# Patient Record
Sex: FEMALE | Race: WHITE | NOT HISPANIC OR LATINO | Employment: FULL TIME | URBAN - METROPOLITAN AREA
[De-identification: names, ages, dates, MRNs, and addresses within clinical notes are randomized per-mention and may not be internally consistent; named-entity substitution may affect disease eponyms.]

---
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
## 2022-05-10 ENCOUNTER — NON-PROVIDER VISIT (OUTPATIENT)
Dept: MEDICAL GROUP | Facility: MEDICAL CENTER | Age: 22
End: 2022-05-10
Payer: COMMERCIAL

## 2022-05-10 DIAGNOSIS — Z11.1 ENCOUNTER FOR TUBERCULIN SKIN TEST: ICD-10-CM

## 2022-05-10 PROCEDURE — 99999 PR NO CHARGE: CPT | Performed by: FAMILY MEDICINE

## 2022-05-10 PROCEDURE — 86580 TB INTRADERMAL TEST: CPT | Performed by: FAMILY MEDICINE

## 2022-05-10 NOTE — PROGRESS NOTES
Annabel Ramírez is a 21 y.o. female here for a non-provider visit for PPD placement -- Step 1 of 1    Reason for PPD:  work requirement    1. TB evaluation questionnaire completed by patient? Yes      -  If any answers marked yes did you contact a provider prior to placing? Not Indicated  2.  Patient notified to return to clinic for reading on: Thursday May 12th after 8:20am or Friday May 13th before 8:20am  3.  PPD Placement documentation completed on TB evaluation questionnaire? Yes  4.  Location of TB evaluation questionnaire filed: [facility]

## 2022-05-11 ENCOUNTER — OFFICE VISIT (OUTPATIENT)
Dept: MEDICAL GROUP | Facility: MEDICAL CENTER | Age: 22
End: 2022-05-11
Payer: COMMERCIAL

## 2022-05-11 VITALS
RESPIRATION RATE: 18 BRPM | BODY MASS INDEX: 22.13 KG/M2 | HEART RATE: 77 BPM | SYSTOLIC BLOOD PRESSURE: 100 MMHG | WEIGHT: 141 LBS | OXYGEN SATURATION: 99 % | HEIGHT: 67 IN | TEMPERATURE: 98.2 F | DIASTOLIC BLOOD PRESSURE: 60 MMHG

## 2022-05-11 DIAGNOSIS — J02.9 SORE THROAT: ICD-10-CM

## 2022-05-11 LAB
HETEROPH AB SER QL LA: POSITIVE
INT CON NEG: NEGATIVE
INT CON NEG: NEGATIVE
INT CON POS: POSITIVE
INT CON POS: POSITIVE
S PYO AG THROAT QL: NEGATIVE

## 2022-05-11 PROCEDURE — 99213 OFFICE O/P EST LOW 20 MIN: CPT | Performed by: FAMILY MEDICINE

## 2022-05-11 PROCEDURE — 87880 STREP A ASSAY W/OPTIC: CPT | Performed by: FAMILY MEDICINE

## 2022-05-11 PROCEDURE — 86308 HETEROPHILE ANTIBODY SCREEN: CPT | Performed by: FAMILY MEDICINE

## 2022-05-11 ASSESSMENT — PATIENT HEALTH QUESTIONNAIRE - PHQ9: CLINICAL INTERPRETATION OF PHQ2 SCORE: 0

## 2022-05-12 ENCOUNTER — NON-PROVIDER VISIT (OUTPATIENT)
Dept: MEDICAL GROUP | Facility: MEDICAL CENTER | Age: 22
End: 2022-05-12
Payer: COMMERCIAL

## 2022-05-12 LAB — TB WHEAL 3D P 5 TU DIAM: NORMAL MM

## 2022-05-12 PROCEDURE — 99999 PR NO CHARGE: CPT | Performed by: FAMILY MEDICINE

## 2022-05-12 NOTE — PROGRESS NOTES
Annabel Ramírez is a 21 y.o. female here for a non-provider visit for PPD reading -- Step 1 of 2.      1.  Resulted in Epic under enter/edit results? Yes   2.  TB evaluation questionnaire scanned into chart and original given to patient?Yes      3. Was induration greater than 0 mm? No.

## 2022-05-12 NOTE — PROGRESS NOTES
"[facility]  Progress Note  Established Patient    Subjective:   Annabel Ramírez is a 21 y.o. female here today with a chief complaint of sore throat. The patient is alone.     Sore throat  Patient reports 5 days of a sore throat worse when she swallows and relieved with Advil.  She denies fever, chills, shortness of breath.  She mentions a slight, occasional cough.  She tested herself for COVID at home and this was negative.      Current Outpatient Medications on File Prior to Visit   Medication Sig Dispense Refill   • triamcinolone acetonide (KENALOG) 0.1 % Cream APPLY TO AFFECTED PARTS DAILY     • levonorgestrel (MIRENA, 52 MG,) 52 mg (20 mcg/24 hr) IUD 1 Each by Intrauterine route one time.     • betamethasone dipropionate (DIPROLENE) 0.05 % Ointment Apply a thin layer to rash BID PRN. Do not use longer than 7 days straight. 50 g 1   • ibuprofen (MOTRIN) 200 MG Tab Take 200 mg by mouth every 6 hours as needed.       No current facility-administered medications on file prior to visit.          Objective:     Vitals:    05/11/22 1600   BP: 100/60   BP Location: Left arm   Patient Position: Sitting   BP Cuff Size: Adult long   Pulse: 77   Resp: 18   Temp: 36.8 °C (98.2 °F)   TempSrc: Temporal   SpO2: 99%   Weight: 64 kg (141 lb)   Height: 1.702 m (5' 7\")       Physical Exam:  General: alert in no apparent distress.   ENMT: There is pharyngeal erythema and tonsillar exudates.  There is anterior and posterior cervical lymphadenopathy.  Uvula midline.  Abdomen: No splenomegaly.        Assessment and Plan:     1. Sore throat  Point-of-care mono testing is positive.  Patient has mononucleosis.  Point-of-care strep and COVID testing is negative.  I reviewed supportive measures with the patient and the need to return with any worsening symptoms or if his symptoms do not resolve within about 10 days.  Handout provided to the patient.  Advised her to avoid contact sports for 4 weeks.  No indication for " steroids.        Followup: Return if symptoms worsen or fail to improve.

## 2022-10-09 ENCOUNTER — APPOINTMENT (OUTPATIENT)
Dept: URGENT CARE | Facility: CLINIC | Age: 22
End: 2022-10-09
Payer: COMMERCIAL

## 2022-11-02 ENCOUNTER — NON-PROVIDER VISIT (OUTPATIENT)
Dept: OCCUPATIONAL MEDICINE | Facility: CLINIC | Age: 22
End: 2022-11-02
Payer: COMMERCIAL

## 2022-11-02 DIAGNOSIS — Z02.1 PRE-EMPLOYMENT HEALTH SCREENING EXAMINATION: ICD-10-CM

## 2022-11-02 DIAGNOSIS — Z02.1 PRE-EMPLOYMENT DRUG SCREENING: Primary | ICD-10-CM

## 2022-11-02 LAB
AMP AMPHETAMINE: NORMAL
BAR BARBITURATES: NORMAL
BZO BENZODIAZEPINES: NORMAL
COC COCAINE: NORMAL
INT CON NEG: NORMAL
INT CON POS: NORMAL
MDMA ECSTASY: NORMAL
MET METHAMPHETAMINES: NORMAL
MTD METHADONE: NORMAL
OPI OPIATES: NORMAL
OXY OXYCODONE: NORMAL
PCP PHENCYCLIDINE: NORMAL
POC URINE DRUG SCREEN OCDRS: NEGATIVE
THC: NORMAL

## 2022-11-02 PROCEDURE — 80305 DRUG TEST PRSMV DIR OPT OBS: CPT | Performed by: PREVENTIVE MEDICINE

## 2022-11-02 PROCEDURE — 86580 TB INTRADERMAL TEST: CPT | Performed by: NURSE PRACTITIONER

## 2022-11-04 ENCOUNTER — NON-PROVIDER VISIT (OUTPATIENT)
Dept: OCCUPATIONAL MEDICINE | Facility: CLINIC | Age: 22
End: 2022-11-04
Payer: COMMERCIAL

## 2022-11-07 LAB — TB WHEAL 3D P 5 TU DIAM: NORMAL MM

## 2022-11-09 ENCOUNTER — NON-PROVIDER VISIT (OUTPATIENT)
Dept: OCCUPATIONAL MEDICINE | Facility: CLINIC | Age: 22
End: 2022-11-09
Payer: COMMERCIAL

## 2022-11-09 DIAGNOSIS — Z11.1 ENCOUNTER FOR PPD TEST: Primary | ICD-10-CM

## 2022-11-09 PROCEDURE — 86580 TB INTRADERMAL TEST: CPT | Performed by: NURSE PRACTITIONER

## 2022-11-11 ENCOUNTER — NON-PROVIDER VISIT (OUTPATIENT)
Dept: OCCUPATIONAL MEDICINE | Facility: CLINIC | Age: 22
End: 2022-11-11
Payer: COMMERCIAL

## 2022-11-11 DIAGNOSIS — Z11.1 ENCOUNTER FOR PPD SKIN TEST READING: ICD-10-CM

## 2022-11-11 LAB — TB WHEAL 3D P 5 TU DIAM: NORMAL MM

## 2022-12-28 ENCOUNTER — HOSPITAL ENCOUNTER (OUTPATIENT)
Dept: RADIOLOGY | Facility: MEDICAL CENTER | Age: 22
End: 2022-12-28
Attending: CHIROPRACTOR
Payer: COMMERCIAL

## 2022-12-28 DIAGNOSIS — M99.01 CERVICAL SEGMENT DYSFUNCTION: ICD-10-CM

## 2022-12-28 DIAGNOSIS — M99.02 THORACIC SEGMENT DYSFUNCTION: ICD-10-CM

## 2022-12-28 DIAGNOSIS — M99.04 SOMATIC DYSFUNCTION OF SACRAL REGION: ICD-10-CM

## 2022-12-28 DIAGNOSIS — M99.03 SOMATIC DYSFUNCTION OF LUMBAR REGION: ICD-10-CM

## 2022-12-28 PROCEDURE — 72072 X-RAY EXAM THORAC SPINE 3VWS: CPT

## 2022-12-28 PROCEDURE — 72170 X-RAY EXAM OF PELVIS: CPT

## 2022-12-28 PROCEDURE — 72040 X-RAY EXAM NECK SPINE 2-3 VW: CPT

## 2022-12-28 PROCEDURE — 72100 X-RAY EXAM L-S SPINE 2/3 VWS: CPT

## 2023-03-14 ENCOUNTER — HOSPITAL ENCOUNTER (EMERGENCY)
Facility: MEDICAL CENTER | Age: 23
End: 2023-03-14
Attending: EMERGENCY MEDICINE
Payer: COMMERCIAL

## 2023-03-14 VITALS
WEIGHT: 141.09 LBS | OXYGEN SATURATION: 97 % | SYSTOLIC BLOOD PRESSURE: 125 MMHG | RESPIRATION RATE: 19 BRPM | HEART RATE: 84 BPM | BODY MASS INDEX: 22.15 KG/M2 | TEMPERATURE: 98.2 F | HEIGHT: 67 IN | DIASTOLIC BLOOD PRESSURE: 81 MMHG
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
## 2023-03-14 DIAGNOSIS — R55 VASOVAGAL SYNCOPE: ICD-10-CM

## 2023-03-14 PROCEDURE — 36415 COLL VENOUS BLD VENIPUNCTURE: CPT

## 2023-03-14 PROCEDURE — 82962 GLUCOSE BLOOD TEST: CPT

## 2023-03-14 PROCEDURE — 99284 EMERGENCY DEPT VISIT MOD MDM: CPT

## 2023-03-14 NOTE — DISCHARGE INSTRUCTIONS
Make sure that you drink plenty of fluids and eat breakfast this morning.  Tylenol if needed for pain.  Return if problems

## 2023-03-14 NOTE — ED TRIAGE NOTES
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 Pt became lightheaded and had syncopal event hitting the glass door of the room and falling to the floor. ER staff and ERP immediately w/ pt. 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

## 2023-03-14 NOTE — LETTER
"  FORM C-4:  EMPLOYEE’S CLAIM FOR COMPENSATION/ REPORT OF INITIAL TREATMENT  EMPLOYEE’S CLAIM - PROVIDE ALL INFORMATION REQUESTED   First Name Annabel Last Name Darrell Birthdate 2000  Sex female Claim Number   Home Address [address]             Zip [address]                                   Age  22 y.o. 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 Be specific and answer in detail. Use additional sheet if necessary)  I passed out and fell against the door of a patient's room before  fallig to the ground   If you believe that you have an occupational disease, when did you first have knowledge of the disability and it relationship to your employment? N/A Witnesses to the Accident  Dr. Karlene Montero   Nature of Injury or Occupational Disease  Workers' Compensation Part(s) of Body Injured or Affected  Defer, ,     I CERTIFY THAT THE ABOVE IS TRUE AND CORRECT TO THE BEST OF MY KNOWLEDGE AND THAT I HAVE PROVIDED THIS INFORMATION IN ORDER TO OBTAIN THE BENEFITS OF [place]  I HEREBY AUTHORIZE ANY PHYSICIAN, CHIROPRACTOR, SURGEON, PRACTITIONER, OR OTHER PERSON, ANY HOSPITAL, INCLUDING [facility], ANY MEDICAL SERVICE ORGANIZATION, ANY INSURANCE COMPANY, OR OTHER INSTITUTION OR ORGANIZATION TO RELEASE TO EACH OTHER, ANY MEDICAL OR OTHER INFORMATION, INCLUDING BENEFITS PAID OR PAYABLE, PERTINENT TO THIS INJURY OR DISEASE, EXCEPT INFORMATION RELATIVE TO DIAGNOSIS, TREATMENT AND/OR COUNSELING FOR AIDS, PSYCHOLOGICAL CONDITIONS, ALCOHOL OR CONTROLLED SUBSTANCES, FOR WHICH I MUST GIVE SPECIFIC AUTHORIZATION.  A PHOTOSTAT OF THIS AUTHORIZATION SHALL BE AS VALID AS THE ORIGINAL.  Date    03/14/2023    Place  [facility]     Employee’s Signature   THIS REPORT MUST BE COMPLETED AND MAILED WITHIN 3 WORKING DAYS OF TREATMENT   Place [facility], EMERGENCY DEPT                       Name of Facility [facility]   Date  3/14/2023 Diagnosis  (W18.30XA) Fall from ground level  (R55) Vasovagal syncope Is there evidence the injured employee was under the influence of alcohol and/or another controlled substance at the time of accident?   Hour  6:13 AM Description of Injury or Disease  Fall from ground level  Vasovagal syncope No   Treatment  Patient had vasovagal syncopal episode, no injuries were sustained.  She was given p.o. juice and granola bars and she is feeling remarkably improved.  She is able to ambulate with no dizziness.  She will be discharged in stable and improved condition.  Have you advised the patient to remain off work five days or more?         No   X-Ray Findings    If Yes   From Date    To Date      From information given by the employee, together with medical evidence, can you directly connect this injury or occupational disease as job incurred? Yes If No, is employee capable of: Full Duty  Yes Modified Duty      Is  "additional medical care by a physician indicated? No If Modified Duty, Specify any Limitations / Restrictions       Do you know of any previous injury or disease contributing to this condition or occupational disease? No    Date 3/14/2023 Print Doctor’s Name Winstno Karleneliza GODINEZ certify the employer’s copy of this form was mailed on:   Address [address] [place] [address]  [place] [id number] [phone] INSURER’S USE ONLY   Provider’s Tax ID Number [id number] Telephone Dept: [phone]    Doctor’s Signature e-SignKARLENE HUGHES D.O. Degree M.D.      Form C-4 (rev.10/07)                                                                         BRIEF DESCRIPTION OF RIGHTS AND BENEFITS  (Pursuant to [place] 616[place])    Notice of Injury or Occupational Disease (Incident Report Form C-1): If an injury or occupational disease (OD) arises out of and in the course of employment, you must provide written notice to your employer as soon as practicable, but no later than 7 days after the accident or OD. Your employer shall maintain a sufficient supply of the required forms.    Claim for Compensation (Form C-4): If medical treatment is sought, the form C-4 is available at the place of initial treatment. A completed \"Claim for Compensation\" (Form C-4) must be filed within 90 days after an accident or OD. The treating physician or chiropractor must, within 3 working days after treatment, complete and mail to the employer, the employer's insurer and third-party , the Claim for Compensation.    Medical Treatment: If you require medical treatment for your on-the-job injury or OD, you may be required to select a physician or chiropractor from a list provided by your workers’ compensation insurer, if it has contracted with an Organization for Managed Care (MCO) or Preferred Provider Organization (PPO) or providers of health care. If your employer has not entered into a contract with an MCO or PPO, you may select a physician or " chiropractor from the Panel of Physicians and Chiropractors. Any medical costs related to your industrial injury or OD will be paid by your insurer.    Temporary Total Disability (TTD): If your doctor has certified that you are unable to work for a period of at least 5 consecutive days, or 5 cumulative days in a 20-day period, or places restrictions on you that your employer does not accommodate, you may be entitled to TTD compensation.    Temporary Partial Disability (TPD): If the wage you receive upon reemployment is less than the compensation for TTD to which you are entitled, the insurer may be required to pay you TPD compensation to make up the difference. TPD can only be paid for a maximum of 24 months.    Permanent Partial Disability (PPD): When your medical condition is stable and there is an indication of a PPD as a result of your injury or OD, within 30 days, your insurer must arrange for an evaluation by a rating physician or chiropractor to determine the degree of your PPD. The amount of your PPD award depends on the date of injury, the results of the PPD evaluation, your age and wage.    Permanent Total Disability (PTD): If you are medically certified by a treating physician or chiropractor as permanently and totally disabled and have been granted a PTD status by your insurer, you are entitled to receive monthly benefits not to exceed 66 2/3% of your average monthly wage. The amount of your PTD payments is subject to reduction if you previously received a lump-sum PPD award.    Vocational Rehabilitation Services: You may be eligible for vocational rehabilitation services if you are unable to return to the job due to a permanent physical impairment or permanent restrictions as a result of your injury or occupational disease.    Transportation and Per Cat Reimbursement: You may be eligible for travel expenses and per cat associated with medical treatment.    Reopening: You may be able to reopen your  claim if your condition worsens after claim closure.     Appeal Process: If you disagree with a written determination issued by the insurer or the insurer does not respond to your request, you may appeal to the Department of Administration, , by following the instructions contained in your determination letter. You must appeal the determination within 70 days from the date of the determination letter at [address], [address], [place], [place] [address], or [address], [address], [place], [place] [address]. If you disagree with the  decision, you may appeal to the Department of Administration, . You must file your appeal within 30 days from the date of the  decision letter at [address], [address], [place], [place] [address], or [address] [facility] [address], [facility] [address], [place], [place] [address]. If you disagree with a decision of an , you may file a petition for judicial review with the District Court. You must do so within 30 days of the Appeal Officer’s decision. You may be represented by an  at your own expense or you may contact the [facility] for possible representation.    [place]  for Injured Workers (NAIW): If you disagree with a  decision, you may request that NAIW represent you without charge at an  Hearing. For information regarding denial of benefits, you may contact the [facility] at: [address], [address], [place] [address], [phone], or [address], [place] [address], [phone]    To File a Complaint with the Division: If you wish to file a complaint with the  of the Division of Industrial Relations (DIR),  please contact the Workers’ Compensation Section, [address], [facility] [address], [place], [place] [address], telephone [phone], or [address], [place], [place] [address], telephone  [phone].    For assistance with Workers’ Compensation Issues: You may contact the [facility] Office for Consumer Health Assistance, [facility] [facility], [facility] [address], [place] [address], Toll Free 1-893.586.8859, Web site: http://[facility].[place].gov/Programs/MALGORZATA E-mail: malgorzata@[facility].[place].gov  D-2 (rev. 10/20)              __________________________________________________________________                                    03/14/2023            Employee Name / Signature                                                                                                                            Date

## 2023-03-15 LAB — GLUCOSE BLD STRIP.AUTO-MCNC: 90 MG/DL (ref 65–99)

## 2023-03-15 NOTE — ED PROVIDER NOTES
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 Pt became lightheaded and had syncopal event hitting the glass door of the room and falling to the floor. ER staff and ERP immediately w/ pt. +LOC, pt awoke approx 1 minute later       EXTERNAL RECORDS REVIEWED  Other none available    HPI/ROS  LIMITATION TO HISTORY   Select: : None  OUTSIDE HISTORIAN(S):  Nursing staff    Annabellowell Rich Darrell is a 22 y.o. female who presents tonight after syncopal episode in the patient's room while working with me.  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  She felt lightheaded and suddenly passed out.  She landed on the floor between the computer and the break out glass doors in the patient's room.  She has no complaints at this time.    PAST MEDICAL HISTORY   has a past medical history of Eczema.    SURGICAL HISTORY   has a past surgical history that includes dental extraction(s).    FAMILY HISTORY  Family History   Problem Relation Age of Onset    No Known Problems Mother     No Known Problems Father     No Known Problems Brother        SOCIAL HISTORY  Social History     Tobacco Use    Smoking status: Never    Smokeless tobacco: Never   Vaping Use    Vaping Use: Never used   Substance and Sexual Activity    Alcohol use: No    Drug use: No    Sexual activity: Yes     Partners: Male     Comment: Mirena       CURRENT MEDICATIONS  Home Medications       Reviewed by Laquita Huber R.N. (Registered Nurse) on 03/14/23 at 0528  Med List Status: Partial     Medication Last Dose Status   betamethasone dipropionate (DIPROLENE) 0.05 % Ointment  Active   ibuprofen (MOTRIN) 200 MG Tab  Active   levonorgestrel (MIRENA, 52 MG,) 52 mg (20 mcg/24 hr) IUD  Active   triamcinolone acetonide  "(KENALOG) 0.1 % Cream  Active                    ALLERGIES  Allergies   Allergen Reactions    Latex        PHYSICAL EXAM  VITAL SIGNS: /81   Pulse 84   Temp 36.8 °C (98.2 °F) (Temporal)   Resp 19   Ht 1.702 m (5' 7\")   Wt 64 kg (141 lb 1.5 oz)   SpO2 97%   BMI 22.10 kg/m²    Constitutional: Patient is well developed, well nourished. Non-toxic appearing.  Mild distress.  HENT: Normocephalic, atraumatic.  Nose normal with no bloody drainage. Oropharynx moist .  Eyes: PERRL, EOMI, Conjunctiva with mild erythema bilaterally.  Neck: Supple with no midline tenderness, bony step-offs or deformities.  She has full range of motion in flexion, extension and lateral rotation.  Lymphatic: No lymphadenopathy noted.   Cardiovascular: Normal heart rate and Regular rhythm. No murmur  Thorax & Lungs: Clear and equal breath sounds with good excursion. No respiratory distress, No chest tenderness, or signs of trauma.  Abdomen: Bowel sounds normal in all four quadrants. Soft,nontender  Skin: Warm, Dry, No contusions or abrasions.  Back: No cervical, thoracic, or lumbosacral tenderness.   Extremities: Peripheral pulses 4/4   Musculoskeletal: Normal range of motion in all major joints. No tenderness to palpation or major deformities noted.   Neurologic: Alert & oriented x 3, Normal motor function, Normal sensory function, No lateralizing or focal deficits noted. DTR's 4/4 bilaterally.  Psychiatric: Affect normal, Judgment normal, Mood normal.        COURSE & MEDICAL DECISION MAKING    ED Observation Status? No; Patient does not meet criteria for ED Observation.     INITIAL ASSESSMENT, COURSE AND PLAN  Care Narrative: Fingerstick glucose was performed and was found to be 90, patient was examined from head to toe and there is no signs of any injuries, she has no tenderness, she is awake and alert but is somewhat tremulous.  Blood pressure was stable.  She was given oral juice and granola bar and felt much better.  She will be " discharged home in stable and improved condition.  To follow-up with her primary care doctor as needed, rest and increase fluids over the next 24 hours.            DISPOSITION AND DISCUSSIONS  I have discussed management of the patient with the following physicians and BOLA's:  none    Discussion of management with other [facility] or appropriate source(s): None     Escalation of care considered, and ultimately not performed:after discussion with the patient / family, they have elected to decline an escalation in care    Barriers to care at this time, including but not limited to:  none .     Decision tools and prescription drugs considered including, but not limited to:  none .    FINAL DIAGNOSIS  1. Fall from ground level    2. Vasovagal syncope           Electronically signed by: Karlene Montero D.O., 3/15/2023 1:33 AM

## 2023-04-14 ENCOUNTER — HOSPITAL ENCOUNTER (OUTPATIENT)
Dept: LAB | Facility: MEDICAL CENTER | Age: 23
End: 2023-04-14
Attending: BEHAVIOR ANALYST
Payer: COMMERCIAL

## 2023-04-14 ENCOUNTER — OFFICE VISIT (OUTPATIENT)
Dept: MEDICAL GROUP | Facility: MEDICAL CENTER | Age: 23
End: 2023-04-14
Payer: COMMERCIAL

## 2023-04-14 VITALS
SYSTOLIC BLOOD PRESSURE: 104 MMHG | BODY MASS INDEX: 22.34 KG/M2 | OXYGEN SATURATION: 95 % | DIASTOLIC BLOOD PRESSURE: 52 MMHG | HEIGHT: 66 IN | HEART RATE: 100 BPM | WEIGHT: 139 LBS | TEMPERATURE: 98 F | RESPIRATION RATE: 14 BRPM

## 2023-04-14 DIAGNOSIS — R53.83 OTHER FATIGUE: ICD-10-CM

## 2023-04-14 DIAGNOSIS — Z00.00 WELLNESS EXAMINATION: ICD-10-CM

## 2023-04-14 DIAGNOSIS — R40.0 DAYTIME SLEEPINESS: ICD-10-CM

## 2023-04-14 DIAGNOSIS — R44.2 HYPNAGOGIC HALLUCINATIONS: ICD-10-CM

## 2023-04-14 DIAGNOSIS — M41.129 ADOLESCENT IDIOPATHIC SCOLIOSIS, UNSPECIFIED SPINAL REGION: ICD-10-CM

## 2023-04-14 PROBLEM — J02.9 SORE THROAT: Status: RESOLVED | Noted: 2022-05-11 | Resolved: 2023-04-14

## 2023-04-14 PROBLEM — M41.9 SCOLIOSIS: Status: ACTIVE | Noted: 2023-04-14

## 2023-04-14 LAB
25(OH)D3 SERPL-MCNC: 21 NG/ML (ref 30–100)
ALBUMIN SERPL BCP-MCNC: 4.3 G/DL (ref 3.2–4.9)
ALBUMIN/GLOB SERPL: 1.5 G/DL
ALP SERPL-CCNC: 55 U/L (ref 30–99)
ALT SERPL-CCNC: 11 U/L (ref 2–50)
ANION GAP SERPL CALC-SCNC: 9 MMOL/L (ref 7–16)
AST SERPL-CCNC: 14 U/L (ref 12–45)
BASOPHILS # BLD AUTO: 0.8 % (ref 0–1.8)
BASOPHILS # BLD: 0.06 K/UL (ref 0–0.12)
BILIRUB SERPL-MCNC: 1.4 MG/DL (ref 0.1–1.5)
BUN SERPL-MCNC: 14 MG/DL (ref 8–22)
CALCIUM ALBUM COR SERPL-MCNC: 9.1 MG/DL (ref 8.5–10.5)
CALCIUM SERPL-MCNC: 9.3 MG/DL (ref 8.5–10.5)
CHLORIDE SERPL-SCNC: 104 MMOL/L (ref 96–112)
CO2 SERPL-SCNC: 26 MMOL/L (ref 20–33)
CREAT SERPL-MCNC: 0.77 MG/DL (ref 0.5–1.4)
EOSINOPHIL # BLD AUTO: 0.21 K/UL (ref 0–0.51)
EOSINOPHIL NFR BLD: 2.7 % (ref 0–6.9)
ERYTHROCYTE [DISTWIDTH] IN BLOOD BY AUTOMATED COUNT: 39.4 FL (ref 35.9–50)
EST. AVERAGE GLUCOSE BLD GHB EST-MCNC: 100 MG/DL
GFR SERPLBLD CREATININE-BSD FMLA CKD-EPI: 113 ML/MIN/1.73 M 2
GLOBULIN SER CALC-MCNC: 2.9 G/DL (ref 1.9–3.5)
GLUCOSE SERPL-MCNC: 98 MG/DL (ref 65–99)
HBA1C MFR BLD: 5.1 % (ref 4–5.6)
HCT VFR BLD AUTO: 44.2 % (ref 37–47)
HGB BLD-MCNC: 14.9 G/DL (ref 12–16)
IMM GRANULOCYTES # BLD AUTO: 0.04 K/UL (ref 0–0.11)
IMM GRANULOCYTES NFR BLD AUTO: 0.5 % (ref 0–0.9)
IRON SATN MFR SERPL: 42 % (ref 15–55)
IRON SERPL-MCNC: 119 UG/DL (ref 40–170)
LYMPHOCYTES # BLD AUTO: 2.35 K/UL (ref 1–4.8)
LYMPHOCYTES NFR BLD: 30.2 % (ref 22–41)
MCH RBC QN AUTO: 31 PG (ref 27–33)
MCHC RBC AUTO-ENTMCNC: 33.7 G/DL (ref 33.6–35)
MCV RBC AUTO: 92.1 FL (ref 81.4–97.8)
MONOCYTES # BLD AUTO: 0.62 K/UL (ref 0–0.85)
MONOCYTES NFR BLD AUTO: 8 % (ref 0–13.4)
NEUTROPHILS # BLD AUTO: 4.5 K/UL (ref 2–7.15)
NEUTROPHILS NFR BLD: 57.8 % (ref 44–72)
NRBC # BLD AUTO: 0 K/UL
NRBC BLD-RTO: 0 /100 WBC
PLATELET # BLD AUTO: 231 K/UL (ref 164–446)
PMV BLD AUTO: 10.2 FL (ref 9–12.9)
POTASSIUM SERPL-SCNC: 4.1 MMOL/L (ref 3.6–5.5)
PROT SERPL-MCNC: 7.2 G/DL (ref 6–8.2)
RBC # BLD AUTO: 4.8 M/UL (ref 4.2–5.4)
SODIUM SERPL-SCNC: 139 MMOL/L (ref 135–145)
TIBC SERPL-MCNC: 283 UG/DL (ref 250–450)
TSH SERPL DL<=0.005 MIU/L-ACNC: 0.76 UIU/ML (ref 0.38–5.33)
UIBC SERPL-MCNC: 164 UG/DL (ref 110–370)
VIT B12 SERPL-MCNC: 488 PG/ML (ref 211–911)
WBC # BLD AUTO: 7.8 K/UL (ref 4.8–10.8)

## 2023-04-14 PROCEDURE — 83550 IRON BINDING TEST: CPT

## 2023-04-14 PROCEDURE — 36415 COLL VENOUS BLD VENIPUNCTURE: CPT

## 2023-04-14 PROCEDURE — 82306 VITAMIN D 25 HYDROXY: CPT

## 2023-04-14 PROCEDURE — 84443 ASSAY THYROID STIM HORMONE: CPT

## 2023-04-14 PROCEDURE — 82607 VITAMIN B-12: CPT

## 2023-04-14 PROCEDURE — 83540 ASSAY OF IRON: CPT

## 2023-04-14 PROCEDURE — 99214 OFFICE O/P EST MOD 30 MIN: CPT | Performed by: BEHAVIOR ANALYST

## 2023-04-14 PROCEDURE — 85025 COMPLETE CBC W/AUTO DIFF WBC: CPT

## 2023-04-14 PROCEDURE — 80053 COMPREHEN METABOLIC PANEL: CPT

## 2023-04-14 PROCEDURE — 83036 HEMOGLOBIN GLYCOSYLATED A1C: CPT

## 2023-04-14 ASSESSMENT — PATIENT HEALTH QUESTIONNAIRE - PHQ9: CLINICAL INTERPRETATION OF PHQ2 SCORE: 0

## 2023-04-14 NOTE — PROGRESS NOTES
Subjective:     CC:    Chief Complaint   Patient presents with    Establish Care    Requesting Labs     Concerns of Narcolepsy or Anemia, bouts of tiredness           HISTORY OF THE PRESENT ILLNESS: Patient is a 22 y.o. female. This pleasant patient is here today to establish care and discuss daytime somnolence.  Prior PCP was Dr. Albert.    Problem   Fatigue   Scoliosis    Thoracic and lumbar. Had back pain so was going to PT.      Hypnagogic Hallucinations   Daytime Sleepiness    For the 4-5 years she gets really drowsy and then falls asleep for brief periods. This can happen in class, during tests, and at home. When she is more active it doesn't happen as much. When she is sitting down a lot it can happen several times a day. She doses off for 5-10 minutes. Once she wakes up she feels more energized and refreshed.   She someimtes has intense dreams. She will notice that she writes down weird notes when she is falling asleep and sometimes hallucinates about what is going on.   Denies unexplained syncope. Denies muscle weakness.  She is getting enough sleep.   She has tried eating healthy and could not find associated foods.   She does not snore and boyfriend says she sleeps quietly.   She is restless at night.   Past hx of heavy periods but not since Mirena was placed.   Hx of iron def- she was told not to give blood.      Sore Throat (Resolved)   Healthcare Maintenance (Resolved)       Current Outpatient Medications   Medication Sig    triamcinolone acetonide (KENALOG) 0.1 % Cream APPLY TO AFFECTED PARTS DAILY    levonorgestrel (MIRENA, 52 MG,) 52 mg (20 mcg/24 hr) IUD 1 Each by Intrauterine route one time.    betamethasone dipropionate (DIPROLENE) 0.05 % Ointment Apply a thin layer to rash BID PRN. Do not use longer than 7 days straight.    ibuprofen (MOTRIN) 200 MG Tab Take 200 mg by mouth every 6 hours as needed.        Social History     Socioeconomic History    Marital status: Single   Tobacco Use    Smoking  "status: Never    Smokeless tobacco: Never   Vaping Use    Vaping Use: Never used   Substance and Sexual Activity    Alcohol use: No    Drug use: No    Sexual activity: Yes     Partners: Male     Comment: Mirena       Family History   Problem Relation Age of Onset    No Known Problems Mother     No Known Problems Father     No Known Problems Brother     Cancer Paternal Grandmother         brain cancer    Cancer Paternal Grandfather         lung cancer       ROS: See HPI        Objective:     Exam: /52   Pulse 100   Temp 36.7 °C (98 °F)   Resp 14   Ht 1.676 m (5' 6\")   Wt 63 kg (139 lb)   LMP 03/31/2023   SpO2 95%   BMI 22.44 kg/m²   Body mass index is 22.44 kg/m².    Physical Exam  Constitutional:       Appearance: Normal appearance.   HENT:      Right Ear: Tympanic membrane normal.      Left Ear: Tympanic membrane normal.      Nose: Nose normal.      Mouth/Throat:      Pharynx: No oropharyngeal exudate or posterior oropharyngeal erythema.   Eyes:      Extraocular Movements: Extraocular movements intact.      Pupils: Pupils are equal, round, and reactive to light.   Cardiovascular:      Rate and Rhythm: Normal rate and regular rhythm.      Pulses: Normal pulses.      Heart sounds: Normal heart sounds.   Pulmonary:      Effort: Pulmonary effort is normal.      Breath sounds: Normal breath sounds.   Musculoskeletal:      Cervical back: Normal range of motion and neck supple.   Neurological:      Mental Status: She is alert.              Assessment & Plan:     22 y.o. female with the following -     1. Other fatigue  -Chronic problem.  Labs as indicated  - IRON/TOTAL IRON BIND; Future  - VITAMIN B12; Future  - VITAMIN D,25 HYDROXY (DEFICIENCY); Future  - Referral to Pulmonary and Sleep Medicine    2. Adolescent idiopathic scoliosis, unspecified spinal region  -Continue physical therapy exercises.    3. Hypnagogic hallucinations  4. Daytime sleepiness  -Ongoing problem newly reported to provider.  Uncertain " prognosis.   -Positive features for narcolepsy however does not have classic muscle weakness or loss of muscle control.  We will start with a comprehensive blood panel.  We will start referral to sleep medicine as this can take several months and she is going to be studying abroad this fall.  If diagnosis made with lab work we can cancel sleep medicine referral.  - IRON/TOTAL IRON BIND; Future  - VITAMIN B12; Future  - HEMOGLOBIN A1C; Future  - Referral to Pulmonary and Sleep Medicine  - TSH WITH REFLEX TO FT4; Future  - CBC WITH DIFFERENTIAL; Future  - Comp Metabolic Panel; Future          Return in about 1 year (around 4/14/2024), or if symptoms worsen or fail to improve.    Please note that this dictation was created using voice recognition software. I have made every reasonable attempt to correct obvious errors, but I expect that there are errors of grammar and possibly content that I did not discover before finalizing the note.

## 2023-06-16 NOTE — PROGRESS NOTES
Likely cHTN, still taking labetalol 200mg BID. Blood pressures at home are normal.  Will see  for level 2.  AFP and early glucose screen today.  Had some spotting earlier this week, nothing since Tuesday.  No bright red bleeding, precautions reviewed   Subjective:      Annabel Ramírez is a 17 y.o. female who presents with No chief complaint on file.            Cough   This is a new problem. The current episode started in the past 7 days. The problem occurs constantly. The problem has been unchanged. Associated symptoms include coughing. Pertinent negatives include no fever, sore throat or swollen glands. Nothing aggravates the symptoms. She has tried nothing for the symptoms. The treatment provided no relief.       Review of Systems   Constitutional: Negative.  Negative for fever.   HENT: Negative.  Negative for sore throat.    Eyes: Negative.    Respiratory: Positive for cough and sputum production. Negative for shortness of breath and wheezing.    Cardiovascular: Negative.    Skin: Negative.           Objective:     There were no vitals taken for this visit.     Physical Exam   Constitutional: She is oriented to person, place, and time. She appears well-developed and well-nourished. No distress.   HENT:   Head: Normocephalic and atraumatic.   Mouth/Throat: Oropharynx is clear and moist.   Eyes: EOM are normal. Pupils are equal, round, and reactive to light.   Neck: Normal range of motion. Neck supple.   Cardiovascular: Normal rate.    Pulmonary/Chest: Effort normal and breath sounds normal. No respiratory distress. She has no wheezes. She has no rales.   Lymphadenopathy:     She has no cervical adenopathy.   Neurological: She is alert and oriented to person, place, and time.   Skin: Skin is warm and dry.   Psychiatric: She has a normal mood and affect. Her behavior is normal. Judgment and thought content normal.   Nursing note and vitals reviewed.  There were no vitals filed for this visit.  Active Ambulatory Problems     Diagnosis Date Noted   • No Active Ambulatory Problems     Resolved Ambulatory Problems     Diagnosis Date Noted   • No Resolved Ambulatory Problems     No Additional Past Medical History     No current outpatient prescriptions on file prior to  visit.     No current facility-administered medications on file prior to visit.      Gargles, Cepacol lozenges, Aleve/Advil as needed for throat pain  History reviewed. No pertinent family history.  Patient has no allergy information on record.              Assessment/Plan:     ·  bronchitis      · Start w/MucinexD; nsaids; [hold rx abx prn sx persist/worsen]  ·

## 2023-08-03 ENCOUNTER — OFFICE VISIT (OUTPATIENT)
Dept: MEDICAL GROUP | Facility: MEDICAL CENTER | Age: 23
End: 2023-08-03
Payer: COMMERCIAL

## 2023-08-03 VITALS
DIASTOLIC BLOOD PRESSURE: 60 MMHG | TEMPERATURE: 98.4 F | OXYGEN SATURATION: 100 % | HEIGHT: 66 IN | BODY MASS INDEX: 22.74 KG/M2 | WEIGHT: 141.5 LBS | HEART RATE: 68 BPM | RESPIRATION RATE: 16 BRPM | SYSTOLIC BLOOD PRESSURE: 105 MMHG

## 2023-08-03 DIAGNOSIS — Z23 NEED FOR VACCINATION: ICD-10-CM

## 2023-08-03 DIAGNOSIS — G47.33 OBSTRUCTIVE SLEEP APNEA SYNDROME: ICD-10-CM

## 2023-08-03 DIAGNOSIS — L20.82 FLEXURAL ECZEMA: ICD-10-CM

## 2023-08-03 PROCEDURE — 90471 IMMUNIZATION ADMIN: CPT | Performed by: BEHAVIOR ANALYST

## 2023-08-03 PROCEDURE — 99213 OFFICE O/P EST LOW 20 MIN: CPT | Mod: 25 | Performed by: BEHAVIOR ANALYST

## 2023-08-03 PROCEDURE — 3074F SYST BP LT 130 MM HG: CPT | Performed by: BEHAVIOR ANALYST

## 2023-08-03 PROCEDURE — 90621 MENB-FHBP VACC 2/3 DOSE IM: CPT | Performed by: BEHAVIOR ANALYST

## 2023-08-03 PROCEDURE — 3078F DIAST BP <80 MM HG: CPT | Performed by: BEHAVIOR ANALYST

## 2023-08-03 RX ORDER — BETAMETHASONE DIPROPIONATE 0.05 %
OINTMENT (GRAM) TOPICAL
Qty: 50 G | Refills: 1 | Status: SHIPPED | OUTPATIENT
Start: 2023-08-03

## 2023-08-03 ASSESSMENT — ENCOUNTER SYMPTOMS: SHORTNESS OF BREATH: 0

## 2023-08-03 ASSESSMENT — FIBROSIS 4 INDEX: FIB4 SCORE: 0.37

## 2023-08-03 NOTE — PROGRESS NOTES
"Subjective:     CC:    Chief Complaint   Patient presents with    Medication Refill          HISTORY OF THE PRESENT ILLNESS:   Annabel presents today with    Problem   Obstructive Sleep Apnea Syndrome    She was diagnosed with sleep apnea. In the process of trying a dental device but is waiting to be delivered.      Flexural Eczema    Looking for refill of betamethasone. She has a current flare on her palms. She was recently in [facility] and the moist air seemed to worsen skin.   She is using emollients and cereve. No dies or perfumes.          Current Outpatient Medications   Medication Sig    betamethasone dipropionate (DIPROLENE) 0.05 % Ointment Apply a thin layer to rash BID PRN. Do not use longer than 14 days straight.    levonorgestrel (MIRENA, 52 MG,) 52 mg (20 mcg/24 hr) IUD 1 Each by Intrauterine route one time.          ROS: See HPI  Review of Systems   Respiratory:  Negative for shortness of breath.    Cardiovascular:  Negative for chest pain.   Skin:  Positive for itching and rash.         Objective:     Exam: /60 (BP Location: Right arm, Patient Position: Sitting, BP Cuff Size: Adult)   Pulse 68   Temp 36.9 °C (98.4 °F) (Temporal)   Resp 16   Ht 1.676 m (5' 6\")   Wt 64.2 kg (141 lb 8 oz)   SpO2 100%   BMI 22.84 kg/m²   Body mass index is 22.84 kg/m².    Physical Exam  Constitutional:       Appearance: Normal appearance.   HENT:      Head: Normocephalic and atraumatic.   Cardiovascular:      Pulses: Normal pulses.   Pulmonary:      Effort: Pulmonary effort is normal.   Musculoskeletal:      Cervical back: Normal range of motion.   Skin:     Comments: Erythema, cracking, and scaling of the palm of the left hand, hypopigmentation and mild erythema extensor surface of the elbow   Neurological:      General: No focal deficit present.      Mental Status: She is alert.                Assessment & Plan:     22 y.o. female with the following -     1. Flexural eczema  -Chronic problem with current " flare.  -Reiterated importance of moisturizing with emollients, avoiding cosmetic products with chemicals, dyes, and perfumes.  Recommended gloving after applying emollients and/or topical steroids at night.  -Continue as needed betamethasone intermittently to flares.  Continue to use hydrocortisone in between betamethasone use.  - betamethasone dipropionate (DIPROLENE) 0.05 % Ointment; Apply a thin layer to rash BID PRN. Do not use longer than 14 days straight.  Dispense: 50 g; Refill: 1    2. Obstructive sleep apnea syndrome  -Explains hypnagogic hallucinations and daytime sleepiness.   -Continue management with sleep medicine    3. Need for vaccination  Pt desires vaccination.  Risks and benefits discussed.  No contraindications today.  Vaccine given.  Follow-up precautions discussed.  - Meningococcal Vaccine Serogroup B 2-3 Dose (TRUMENBA)          Return in about 1 year (around 8/3/2024), or if symptoms worsen or fail to improve.    Please note that this dictation was created using voice recognition software. I have made every reasonable attempt to correct obvious errors, but I expect that there are errors of grammar and possibly content that I did not discover before finalizing the note.

## 2024-09-08 SDOH — ECONOMIC STABILITY: TRANSPORTATION INSECURITY
IN THE PAST 12 MONTHS, HAS THE LACK OF TRANSPORTATION KEPT YOU FROM MEDICAL APPOINTMENTS OR FROM GETTING MEDICATIONS?: NO

## 2024-09-08 SDOH — ECONOMIC STABILITY: FOOD INSECURITY: WITHIN THE PAST 12 MONTHS, YOU WORRIED THAT YOUR FOOD WOULD RUN OUT BEFORE YOU GOT MONEY TO BUY MORE.: NEVER TRUE

## 2024-09-08 SDOH — ECONOMIC STABILITY: TRANSPORTATION INSECURITY
IN THE PAST 12 MONTHS, HAS LACK OF RELIABLE TRANSPORTATION KEPT YOU FROM MEDICAL APPOINTMENTS, MEETINGS, WORK OR FROM GETTING THINGS NEEDED FOR DAILY LIVING?: NO

## 2024-09-08 SDOH — ECONOMIC STABILITY: INCOME INSECURITY: HOW HARD IS IT FOR YOU TO PAY FOR THE VERY BASICS LIKE FOOD, HOUSING, MEDICAL CARE, AND HEATING?: NOT HARD AT ALL

## 2024-09-08 SDOH — HEALTH STABILITY: MENTAL HEALTH
STRESS IS WHEN SOMEONE FEELS TENSE, NERVOUS, ANXIOUS, OR CAN'T SLEEP AT NIGHT BECAUSE THEIR MIND IS TROUBLED. HOW STRESSED ARE YOU?: ONLY A LITTLE

## 2024-09-08 SDOH — ECONOMIC STABILITY: FOOD INSECURITY: WITHIN THE PAST 12 MONTHS, THE FOOD YOU BOUGHT JUST DIDN'T LAST AND YOU DIDN'T HAVE MONEY TO GET MORE.: NEVER TRUE

## 2024-09-08 SDOH — HEALTH STABILITY: PHYSICAL HEALTH: ON AVERAGE, HOW MANY DAYS PER WEEK DO YOU ENGAGE IN MODERATE TO STRENUOUS EXERCISE (LIKE A BRISK WALK)?: 2 DAYS

## 2024-09-08 SDOH — ECONOMIC STABILITY: HOUSING INSECURITY
IN THE LAST 12 MONTHS, WAS THERE A TIME WHEN YOU DID NOT HAVE A STEADY PLACE TO SLEEP OR SLEPT IN A SHELTER (INCLUDING NOW)?: NO

## 2024-09-08 SDOH — ECONOMIC STABILITY: INCOME INSECURITY: IN THE LAST 12 MONTHS, WAS THERE A TIME WHEN YOU WERE NOT ABLE TO PAY THE MORTGAGE OR RENT ON TIME?: NO

## 2024-09-08 SDOH — HEALTH STABILITY: PHYSICAL HEALTH: ON AVERAGE, HOW MANY MINUTES DO YOU ENGAGE IN EXERCISE AT THIS LEVEL?: 50 MIN

## 2024-09-08 SDOH — ECONOMIC STABILITY: TRANSPORTATION INSECURITY
IN THE PAST 12 MONTHS, HAS LACK OF TRANSPORTATION KEPT YOU FROM MEETINGS, WORK, OR FROM GETTING THINGS NEEDED FOR DAILY LIVING?: NO

## 2024-09-08 ASSESSMENT — SOCIAL DETERMINANTS OF HEALTH (SDOH)
HOW OFTEN DO YOU GET TOGETHER WITH FRIENDS OR RELATIVES?: MORE THAN THREE TIMES A WEEK
IN A TYPICAL WEEK, HOW MANY TIMES DO YOU TALK ON THE PHONE WITH FAMILY, FRIENDS, OR NEIGHBORS?: MORE THAN THREE TIMES A WEEK
HOW MANY DRINKS CONTAINING ALCOHOL DO YOU HAVE ON A TYPICAL DAY WHEN YOU ARE DRINKING: 1 OR 2
ARE YOU MARRIED, WIDOWED, DIVORCED, SEPARATED, NEVER MARRIED, OR LIVING WITH A PARTNER?: NEVER MARRIED
DO YOU BELONG TO ANY CLUBS OR ORGANIZATIONS SUCH AS CHURCH GROUPS UNIONS, FRATERNAL OR ATHLETIC GROUPS, OR SCHOOL GROUPS?: YES
HOW OFTEN DO YOU HAVE A DRINK CONTAINING ALCOHOL: 2-4 TIMES A MONTH
IN A TYPICAL WEEK, HOW MANY TIMES DO YOU TALK ON THE PHONE WITH FAMILY, FRIENDS, OR NEIGHBORS?: MORE THAN THREE TIMES A WEEK
HOW OFTEN DO YOU ATTENT MEETINGS OF THE CLUB OR ORGANIZATION YOU BELONG TO?: MORE THAN 4 TIMES PER YEAR
HOW HARD IS IT FOR YOU TO PAY FOR THE VERY BASICS LIKE FOOD, HOUSING, MEDICAL CARE, AND HEATING?: NOT HARD AT ALL
DO YOU BELONG TO ANY CLUBS OR ORGANIZATIONS SUCH AS CHURCH GROUPS UNIONS, FRATERNAL OR ATHLETIC GROUPS, OR SCHOOL GROUPS?: YES
HOW OFTEN DO YOU HAVE SIX OR MORE DRINKS ON ONE OCCASION: LESS THAN MONTHLY
IN THE PAST 12 MONTHS, HAS THE ELECTRIC, GAS, OIL, OR WATER COMPANY THREATENED TO SHUT OFF SERVICE IN YOUR HOME?: NO
ARE YOU MARRIED, WIDOWED, DIVORCED, SEPARATED, NEVER MARRIED, OR LIVING WITH A PARTNER?: NEVER MARRIED
HOW OFTEN DO YOU ATTEND CHURCH OR RELIGIOUS SERVICES?: NEVER
HOW OFTEN DO YOU ATTEND CHURCH OR RELIGIOUS SERVICES?: NEVER
HOW OFTEN DO YOU ATTENT MEETINGS OF THE CLUB OR ORGANIZATION YOU BELONG TO?: MORE THAN 4 TIMES PER YEAR
WITHIN THE PAST 12 MONTHS, YOU WORRIED THAT YOUR FOOD WOULD RUN OUT BEFORE YOU GOT THE MONEY TO BUY MORE: NEVER TRUE
HOW OFTEN DO YOU GET TOGETHER WITH FRIENDS OR RELATIVES?: MORE THAN THREE TIMES A WEEK

## 2024-09-08 ASSESSMENT — LIFESTYLE VARIABLES
AUDIT-C TOTAL SCORE: 3
HOW MANY STANDARD DRINKS CONTAINING ALCOHOL DO YOU HAVE ON A TYPICAL DAY: 1 OR 2
HOW OFTEN DO YOU HAVE SIX OR MORE DRINKS ON ONE OCCASION: LESS THAN MONTHLY
SKIP TO QUESTIONS 9-10: 0
HOW OFTEN DO YOU HAVE A DRINK CONTAINING ALCOHOL: 2-4 TIMES A MONTH

## 2024-09-11 ENCOUNTER — APPOINTMENT (OUTPATIENT)
Dept: MEDICAL GROUP | Facility: MEDICAL CENTER | Age: 24
End: 2024-09-11
Payer: COMMERCIAL

## 2024-09-11 VITALS
DIASTOLIC BLOOD PRESSURE: 70 MMHG | BODY MASS INDEX: 21.79 KG/M2 | HEIGHT: 66 IN | WEIGHT: 135.58 LBS | SYSTOLIC BLOOD PRESSURE: 116 MMHG | HEART RATE: 70 BPM | OXYGEN SATURATION: 96 % | TEMPERATURE: 97.7 F

## 2024-09-11 DIAGNOSIS — L20.82 FLEXURAL ECZEMA: ICD-10-CM

## 2024-09-11 DIAGNOSIS — G47.33 OBSTRUCTIVE SLEEP APNEA SYNDROME: ICD-10-CM

## 2024-09-11 DIAGNOSIS — R40.0 DAYTIME SLEEPINESS: ICD-10-CM

## 2024-09-11 DIAGNOSIS — Z00.00 WELL FEMALE EXAM WITHOUT GYNECOLOGICAL EXAM: Primary | ICD-10-CM

## 2024-09-11 DIAGNOSIS — Z80.3 FAMILY HISTORY OF BREAST CANCER: ICD-10-CM

## 2024-09-11 DIAGNOSIS — Z13.39 SCREENING FOR ALCOHOL PROBLEM: ICD-10-CM

## 2024-09-11 DIAGNOSIS — R79.89 LOW VITAMIN D LEVEL: ICD-10-CM

## 2024-09-11 DIAGNOSIS — N39.0 FREQUENT UTI: ICD-10-CM

## 2024-09-11 PROCEDURE — 3074F SYST BP LT 130 MM HG: CPT | Performed by: BEHAVIOR ANALYST

## 2024-09-11 PROCEDURE — 3078F DIAST BP <80 MM HG: CPT | Performed by: BEHAVIOR ANALYST

## 2024-09-11 PROCEDURE — 99395 PREV VISIT EST AGE 18-39: CPT | Performed by: BEHAVIOR ANALYST

## 2024-09-11 RX ORDER — BETAMETHASONE DIPROPIONATE 0.05 %
OINTMENT (GRAM) TOPICAL
Qty: 50 G | Refills: 1 | Status: SHIPPED | OUTPATIENT
Start: 2024-09-11

## 2024-09-11 ASSESSMENT — FIBROSIS 4 INDEX: FIB4 SCORE: .4020151261036848302

## 2024-09-11 ASSESSMENT — LIFESTYLE VARIABLES
DURING THE PAST 12 MONTHS, ON HOW MANY DAYS DID YOU USE ANYTHING ELSE TO GET HIGH: 0
DO YOUR FAMILY OR FRIENDS EVER TELL YOU THAT YOU SHOULD CUT DOWN ON YOUR DRINKING OR DRUG USE: NO
DURING THE PAST 12 MONTHS, ON HOW MANY DAYS DID YOU USE ANY TOBACCO OR NICOTINE PRODUCTS: 0
DURING THE PAST 12 MONTHS, ON HOW MANY DAYS DID YOU DRINK MORE THAN A FEW SIPS OF BEER, WINE, OR ANY DRINK CONTAINING ALCOHOL: 15
PART A TOTAL SCORE: 15
HAVE YOU EVER RIDDEN IN A CAR DRIVEN BY SOMEONE WHO WAS HIGH OR HAD BEEN USING ALCOHOL OR DRUGS: YES
DURING THE PAST 12 MONTHS, ON HOW MANY DAYS DID YOU USE ANY MARIJUANA: 0
DO YOU EVER USE ALCOHOL OR DRUGS TO RELAX, FEEL BETTER ABOUT YOURSELF, OR FIT IN: NO
HAVE YOU EVER GOTTEN IN TROUBLE WHILE YOU WERE USING ALCOHOL OR DRUGS: NO
DO YOU EVER USE ALCOHOL OR DRUGS WHILE YOU ARE BY YOURSELF ALONE: NO
DO YOU EVER FORGET THINGS YOU DID WHILE USING ALCOHOL OR DRUGS: YES

## 2024-09-11 ASSESSMENT — PATIENT HEALTH QUESTIONNAIRE - PHQ9: CLINICAL INTERPRETATION OF PHQ2 SCORE: 0

## 2024-09-11 NOTE — PROGRESS NOTES
Subjective:     CC:   Chief Complaint   Patient presents with    Annual Exam    Medication Refill     For Eczema        HPI:   Annabel Ramírez is a 24 y.o. female who presents for annual exam. She is feeling well.       Problem   Frequent Uti    She reports reoccurring UTIs which usually happen after intercourse.  Her last UTI was about a week ago.  She says that she stays well-hydrated, urinates and showers after sexual activity.     Family History of Breast Cancer    In mother at age 56     Low Vitamin D Level   Obstructive Sleep Apnea Syndrome    She was diagnosed with sleep apnea after study and consult with [facility]- was referred to dentist with sleep apnea specialty. Started using a dental device and has been sleeping better and has less fatigue. She admits to some continued daytime sleepiness.           Ob-Gyn/ History:    Patient has GYN provider: yes- Dr. Guerrero   Last Pap Smear:  never done.  Gyn Surgery:  none    Current Contraceptive Method:  IUD. Yes currently sexually active.  Last menstrual period:  2024.  Periods regular. spotting, light bleeding.   No significant bloating/fluid retention, pelvic pain, or dyspareunia. No vaginal discharge  Urinary incontinence: no but frequent UTI  Folate intake: not taking     Health Maintenance  Vitamin D screenin2023  Cholesterol Screening: never completed   Diabetes Screenin2023  Diet: meal prepping with lots of salads and veggies   Exercise: going to the gym 3-4 days a week    Substance Abuse: occasional alcohol use   Safe in relationship.     Sun protection used.    Cancer screening  Cervical Cancer Screening: She plans to see her gynecologist in a month or 2 and will discuss cervical cancer screening at that time.  Breast Cancer Screening: Starting at age 40    Infectious disease screening/Immunizations  --STI Screening: Declines  --Practices safe sex.  --HIV Screening: Declines  --Hepatitis C Screening: Declines  --Immunizations: Due for flu  and COVID vaccines.  Up-to-date on Tdap and HPV    She  has a past medical history of Eczema and Scoliosis.  She  has a past surgical history that includes dental extraction(s) and dental extraction(s).    Family History   Problem Relation Age of Onset    Breast Cancer Mother 56    No Known Problems Father     No Known Problems Brother     Drug abuse Paternal Uncle     Alcohol abuse Paternal Uncle     Hypertension Maternal Grandmother     Dementia Maternal Grandfather         Alzheimer’s    Cancer Paternal Grandmother         Brain cancer, early 30s    Cancer Paternal Grandfather         Lung cancer, 60s-70s       Social History     Socioeconomic History    Marital status: Single     Spouse name: Not on file    Number of children: Not on file    Years of education: Not on file    Highest education level: Some college, no degree   Occupational History    Not on file   Tobacco Use    Smoking status: Never    Smokeless tobacco: Never   Vaping Use    Vaping status: Never Used   Substance and Sexual Activity    Alcohol use: Yes     Comment: 1-2 a month    Drug use: Never    Sexual activity: Yes     Partners: Male     Birth control/protection: I.U.D.     Comment: Mirena   Other Topics Concern    Behavioral problems Not Asked    Interpersonal relationships Not Asked    Sad or not enjoying activities Not Asked    Suicidal thoughts Not Asked    Poor school performance Not Asked    Reading difficulties Not Asked    Speech difficulties Not Asked    Writing difficulties Not Asked    Inadequate sleep Not Asked    Excessive TV viewing Not Asked    Excessive video game use Not Asked    Inadequate exercise Not Asked    Sports related Not Asked    Poor diet Not Asked    Family concerns for drug/alcohol abuse Not Asked    Poor oral hygiene Not Asked    Bike safety Not Asked    Family concerns vehicle safety Not Asked   Social History Narrative    Not on file     Social Determinants of Health     Financial Resource Strain: Low Risk   (9/8/2024)    Overall Financial Resource Strain (CARDIA)     Difficulty of Paying Living Expenses: Not hard at all   Food Insecurity: No Food Insecurity (9/8/2024)    Hunger Vital Sign     Worried About Running Out of Food in the Last Year: Never true     Ran Out of Food in the Last Year: Never true   Transportation Needs: No Transportation Needs (9/8/2024)    PRAPARE - Transportation     Lack of Transportation (Medical): No     Lack of Transportation (Non-Medical): No   Physical Activity: Insufficiently Active (9/8/2024)    Exercise Vital Sign     Days of Exercise per Week: 2 days     Minutes of Exercise per Session: 50 min   Stress: No Stress Concern Present (9/8/2024)    [employer] [place] [employer] - Occupational Stress Questionnaire     Feeling of Stress : Only a little   Social Connections: Moderately Isolated (9/8/2024)    Social Connection and Isolation Panel [NHANES]     Frequency of Communication with Friends and Family: More than three times a week     Frequency of Social Gatherings with Friends and Family: More than three times a week     Attends Confucianism Services: Never     Active Member of Clubs or Organizations: Yes     Attends Club or Organization Meetings: More than 4 times per year     Marital Status: Never    Intimate Partner Violence: Not on file   Housing Stability: High Risk (9/8/2024)    Housing Stability Vital Sign     Unable to Pay for Housing in the Last Year: No     Number of Times Moved in the Last Year: 2     Homeless in the Last Year: No       Patient Active Problem List    Diagnosis Date Noted    Frequent UTI 09/11/2024    Family history of breast cancer 09/11/2024    Low vitamin D level 09/11/2024    Obstructive sleep apnea syndrome 08/03/2023    Fatigue 04/14/2023    Scoliosis 04/14/2023    Hypnagogic hallucinations 04/14/2023    Daytime sleepiness 04/14/2023    Flexural eczema 08/20/2021         Current Outpatient Medications   Medication Sig Dispense Refill     "betamethasone dipropionate (DEL-BETA) 0.05 % Ointment Apply a thin layer to rash BID PRN. Do not use longer than 14 days straight. 50 g 1    levonorgestrel (MIRENA, 52 MG,) 52 mg (20 mcg/24 hr) IUD 1 Each by Intrauterine route one time.       No current facility-administered medications for this visit.     Allergies   Allergen Reactions    Latex        Review of Systems   Constitutional: Negative for fever, chills and malaise/fatigue.   HENT: Negative for congestion.    Eyes: Negative for pain.    Respiratory: Negative for cough and shortness of breath.  Cardiovascular: Negative for leg swelling.   Gastrointestinal: Negative for nausea, vomiting, abdominal pain and diarrhea.   Genitourinary: Negative for dysuria and hematuria.   Skin: Negative for rash.   Neurological: Negative for dizziness, focal weakness and headaches.   Endo/Heme/Allergies: Does not bleed easily.   Psychiatric/Behavioral: Negative for depression.  The patient is not nervous/anxious.      Objective:     /70 (BP Location: Right arm, Patient Position: Sitting, BP Cuff Size: Adult)   Pulse 70   Temp 36.5 °C (97.7 °F) (Temporal)   Ht 1.676 m (5' 6\")   Wt 61.5 kg (135 lb 9.3 oz)   SpO2 96%   BMI 21.88 kg/m²   Body mass index is 21.88 kg/m².  Wt Readings from Last 4 Encounters:   09/11/24 61.5 kg (135 lb 9.3 oz)   08/03/23 64.2 kg (141 lb 8 oz)   04/14/23 63 kg (139 lb)   03/14/23 64 kg (141 lb 1.5 oz)       Physical Exam:  Constitutional: Well-developed and well-nourished. Not diaphoretic. No distress.   Skin: Skin is warm and dry.  Mild eczematous rashes noted on flexural surface of fingers, wrist and ankle  Head: Atraumatic without lesions.  Eyes: Conjunctivae and extraocular motions are normal. Pupils are equal, round, and reactive to light. No scleral icterus.   Ears:  External ears unremarkable. Tympanic membranes clear and intact.  Nose: Nares patent. Septum midline. Turbinates without erythema nor edema. No discharge. "   Mouth/Throat: Dentition is good. Tongue normal. Oropharynx is clear and moist. Posterior pharynx without erythema or exudates.  Neck: Supple, trachea midline. Normal range of motion. No thyromegaly present. No lymphadenopathy--cervical or supraclavicular.  Cardiovascular: Regular rate and rhythm, S1 and S2 without murmur, rubs, or gallops.  Lungs: Normal inspiratory effort, CTA bilaterally, no wheezes/rhonchi/rales  Abdomen: Soft, non tender, and without distention. Active bowel sounds in all four quadrants. No rebound, guarding, masses or HSM.  Extremities: No cyanosis, clubbing, erythema, nor edema. Distal pulses intact and symmetric.   Musculoskeletal: All major joints AROM full in all directions without pain.  Neurological: Alert and oriented x 3. . No cranial nerve deficit. . Sensation intact.   Psychiatric:  Behavior, mood, and affect are appropriate.        Assessment and Plan:     1. Well female exam without gynecological exam         HCM:  Declined STD testing, we discussed immunizations due   Applicable cancer screenings discussed with patient  Last lab results were reviewed by me today   New Labs per orders if indicated  Immunizations per orders if indicated  Patient counseling included stop/avoid smoking and nicotine products,  skin care with SPF, regular dental hygiene and dental visits and eye exams, diet, supplements, safe fire arm storage, safe sex and exercise     2. Flexural eczema  betamethasone dipropionate (DEL-BETA) 0.05 % Ointment     -We discussed management and prevention of eczema.  We discussed proper administration of the betamethasone cream.     3. Obstructive sleep apnea syndrome  Overnight Home Sleep Study     -Recommend repeat sleep study as she continues to have some daytime sleepiness and it has not been reevaluated since she started using the dental appliance.  - STOP BANG score of 3     4. Daytime sleepiness  Overnight Home Sleep Study      5. Frequent UTI       - When UTI  symptoms occur immediately start 1.5 g d-mannose, twice daily for three days and then once a day for 10 days.  Continue preventative measures     6. Screening for alcohol problem       -Brief intervention performed including dangers of driving while under the influence or driving in a car with someone under the influence.  We discussed the dangers of binge drinking.     7. Family history of breast cancer       Start mammograms at age 40     8. Low vitamin D level       -  Instructed to start taking 2000iu vitamin D3 per day which can be obtained over-the-counter.              Follow-up: Return in about 1 year (around 9/11/2025) for Annual preventative.

## 2024-09-11 NOTE — PATIENT INSTRUCTIONS
- UTI symptoms occur immediately start 1.5 g d-mannose, twice daily for three days and then once a day for 10 days.  - - Instructed to start taking 2000iu vitamin D3 per day which can be obtained over-the-counter.

## 2024-10-30 ENCOUNTER — TELEPHONE (OUTPATIENT)
Dept: HEALTH INFORMATION MANAGEMENT | Facility: OTHER | Age: 24
End: 2024-10-30
Payer: COMMERCIAL

## 2024-11-03 ENCOUNTER — PHARMACY VISIT (OUTPATIENT)
Dept: PHARMACY | Facility: MEDICAL CENTER | Age: 24
End: 2024-11-03
Payer: COMMERCIAL

## 2024-11-03 ENCOUNTER — APPOINTMENT (OUTPATIENT)
Dept: RADIOLOGY | Facility: MEDICAL CENTER | Age: 24
End: 2024-11-03
Attending: EMERGENCY MEDICINE
Payer: COMMERCIAL

## 2024-11-03 ENCOUNTER — HOSPITAL ENCOUNTER (EMERGENCY)
Facility: MEDICAL CENTER | Age: 24
End: 2024-11-03
Attending: EMERGENCY MEDICINE
Payer: COMMERCIAL

## 2024-11-03 VITALS
HEART RATE: 80 BPM | RESPIRATION RATE: 14 BRPM | BODY MASS INDEX: 22.96 KG/M2 | SYSTOLIC BLOOD PRESSURE: 111 MMHG | DIASTOLIC BLOOD PRESSURE: 57 MMHG | OXYGEN SATURATION: 94 % | WEIGHT: 142.86 LBS | TEMPERATURE: 98.2 F | HEIGHT: 66 IN

## 2024-11-03 DIAGNOSIS — K66.1 INTRAPERITONEAL HEMORRHAGE: ICD-10-CM

## 2024-11-03 LAB
ALBUMIN SERPL BCP-MCNC: 4.2 G/DL (ref 3.2–4.9)
ALBUMIN/GLOB SERPL: 1.6 G/DL
ALP SERPL-CCNC: 55 U/L (ref 30–99)
ALT SERPL-CCNC: 7 U/L (ref 2–50)
ANION GAP SERPL CALC-SCNC: 8 MMOL/L (ref 7–16)
APPEARANCE UR: ABNORMAL
AST SERPL-CCNC: 16 U/L (ref 12–45)
BACTERIA #/AREA URNS HPF: ABNORMAL /HPF
BASOPHILS # BLD AUTO: 0.4 % (ref 0–1.8)
BASOPHILS # BLD: 0.03 K/UL (ref 0–0.12)
BILIRUB SERPL-MCNC: 0.9 MG/DL (ref 0.1–1.5)
BILIRUB UR QL STRIP.AUTO: NEGATIVE
BUN SERPL-MCNC: 17 MG/DL (ref 8–22)
CALCIUM ALBUM COR SERPL-MCNC: 8.9 MG/DL (ref 8.5–10.5)
CALCIUM SERPL-MCNC: 9.1 MG/DL (ref 8.5–10.5)
CASTS URNS QL MICRO: ABNORMAL /LPF (ref 0–2)
CHLORIDE SERPL-SCNC: 109 MMOL/L (ref 96–112)
CO2 SERPL-SCNC: 24 MMOL/L (ref 20–33)
COLOR UR: YELLOW
CREAT SERPL-MCNC: 0.76 MG/DL (ref 0.5–1.4)
EOSINOPHIL # BLD AUTO: 0.15 K/UL (ref 0–0.51)
EOSINOPHIL NFR BLD: 1.8 % (ref 0–6.9)
EPITHELIAL CELLS 1715: ABNORMAL /HPF (ref 0–5)
ERYTHROCYTE [DISTWIDTH] IN BLOOD BY AUTOMATED COUNT: 40.1 FL (ref 35.9–50)
GFR SERPLBLD CREATININE-BSD FMLA CKD-EPI: 113 ML/MIN/1.73 M 2
GLOBULIN SER CALC-MCNC: 2.6 G/DL (ref 1.9–3.5)
GLUCOSE SERPL-MCNC: 107 MG/DL (ref 65–99)
GLUCOSE UR STRIP.AUTO-MCNC: NEGATIVE MG/DL
HCG SERPL QL: NEGATIVE
HCT VFR BLD AUTO: 41.4 % (ref 37–47)
HGB BLD-MCNC: 13.6 G/DL (ref 12–16)
IMM GRANULOCYTES # BLD AUTO: 0.03 K/UL (ref 0–0.11)
IMM GRANULOCYTES NFR BLD AUTO: 0.4 % (ref 0–0.9)
KETONES UR STRIP.AUTO-MCNC: NEGATIVE MG/DL
LEUKOCYTE ESTERASE UR QL STRIP.AUTO: ABNORMAL
LIPASE SERPL-CCNC: 24 U/L (ref 11–82)
LYMPHOCYTES # BLD AUTO: 1.99 K/UL (ref 1–4.8)
LYMPHOCYTES NFR BLD: 23.8 % (ref 22–41)
MCH RBC QN AUTO: 30.7 PG (ref 27–33)
MCHC RBC AUTO-ENTMCNC: 32.9 G/DL (ref 32.2–35.5)
MCV RBC AUTO: 93.5 FL (ref 81.4–97.8)
MICRO URNS: ABNORMAL
MONOCYTES # BLD AUTO: 0.81 K/UL (ref 0–0.85)
MONOCYTES NFR BLD AUTO: 9.7 % (ref 0–13.4)
NEUTROPHILS # BLD AUTO: 5.36 K/UL (ref 1.82–7.42)
NEUTROPHILS NFR BLD: 63.9 % (ref 44–72)
NITRITE UR QL STRIP.AUTO: NEGATIVE
NRBC # BLD AUTO: 0 K/UL
NRBC BLD-RTO: 0 /100 WBC (ref 0–0.2)
PH UR STRIP.AUTO: 7 [PH] (ref 5–8)
PLATELET # BLD AUTO: 221 K/UL (ref 164–446)
PMV BLD AUTO: 9.5 FL (ref 9–12.9)
POTASSIUM SERPL-SCNC: 4.1 MMOL/L (ref 3.6–5.5)
PROT SERPL-MCNC: 6.8 G/DL (ref 6–8.2)
PROT UR QL STRIP: NEGATIVE MG/DL
RBC # BLD AUTO: 4.43 M/UL (ref 4.2–5.4)
RBC # URNS HPF: ABNORMAL /HPF (ref 0–2)
RBC UR QL AUTO: NEGATIVE
SODIUM SERPL-SCNC: 141 MMOL/L (ref 135–145)
SP GR UR STRIP.AUTO: 1.03
UROBILINOGEN UR STRIP.AUTO-MCNC: 1 EU/DL
WBC # BLD AUTO: 8.4 K/UL (ref 4.8–10.8)
WBC #/AREA URNS HPF: ABNORMAL /HPF

## 2024-11-03 PROCEDURE — RXMED WILLOW AMBULATORY MEDICATION CHARGE: Performed by: EMERGENCY MEDICINE

## 2024-11-03 PROCEDURE — 87086 URINE CULTURE/COLONY COUNT: CPT

## 2024-11-03 PROCEDURE — 87186 SC STD MICRODIL/AGAR DIL: CPT | Mod: 91

## 2024-11-03 PROCEDURE — 87077 CULTURE AEROBIC IDENTIFY: CPT | Mod: 91

## 2024-11-03 PROCEDURE — 85025 COMPLETE CBC W/AUTO DIFF WBC: CPT

## 2024-11-03 PROCEDURE — 80053 COMPREHEN METABOLIC PANEL: CPT

## 2024-11-03 PROCEDURE — 36415 COLL VENOUS BLD VENIPUNCTURE: CPT

## 2024-11-03 PROCEDURE — 74177 CT ABD & PELVIS W/CONTRAST: CPT

## 2024-11-03 PROCEDURE — 700117 HCHG RX CONTRAST REV CODE 255: Performed by: EMERGENCY MEDICINE

## 2024-11-03 PROCEDURE — 81003 URINALYSIS AUTO W/O SCOPE: CPT

## 2024-11-03 PROCEDURE — 83690 ASSAY OF LIPASE: CPT

## 2024-11-03 PROCEDURE — 81015 MICROSCOPIC EXAM OF URINE: CPT

## 2024-11-03 PROCEDURE — 84703 CHORIONIC GONADOTROPIN ASSAY: CPT

## 2024-11-03 PROCEDURE — 99284 EMERGENCY DEPT VISIT MOD MDM: CPT

## 2024-11-03 RX ORDER — OXYCODONE AND ACETAMINOPHEN 5; 325 MG/1; MG/1
1 TABLET ORAL EVERY 4 HOURS PRN
Qty: 20 TABLET | Refills: 0 | Status: SHIPPED | OUTPATIENT
Start: 2024-11-03 | End: 2024-11-08

## 2024-11-03 RX ADMIN — IOHEXOL 100 ML: 350 INJECTION, SOLUTION INTRAVENOUS at 12:45

## 2024-11-03 ASSESSMENT — FIBROSIS 4 INDEX: FIB4 SCORE: .4020151261036848302

## 2024-11-03 NOTE — ED NOTES
"Pt discharged home with family. IV discontinued and gauze placed, pt in possession of belongings. Pt provided discharge education and information pertaining to medications and follow up appointments. Pt received copy of discharge instructions and verbalized understanding. /57   Pulse 80   Temp 36.8 °C (98.2 °F) (Temporal)   Resp 14   Ht 1.676 m (5' 6\")   Wt 64.8 kg (142 lb 13.7 oz)   SpO2 94%   BMI 23.06 kg/m²    "

## 2024-11-03 NOTE — ED NOTES
Pt laying in gurney comfortably without distress. VSS. Pt updated on plan of care and advised to let staff know with additional concerns.

## 2024-11-03 NOTE — ED NOTES
Pt ambulatory to Ortho 2 from the lobby with a steady gait. Agree with triage note. Chart up for ERP.

## 2024-11-03 NOTE — ED TRIAGE NOTES
Chief Complaint   Patient presents with    Abdominal Pain     Upper right side since 1800 yesterday.

## 2024-11-03 NOTE — ED PROVIDER NOTES
ED Provider Note    CHIEF COMPLAINT  Chief Complaint   Patient presents with    Abdominal Pain     Upper right side since 1800 yesterday.        EXTERNAL RECORDS REVIEWED  Outpatient Notes patient was seen as an outpatient at her annual exam on September 11, 2024 for medical refill of eczema medication    HPI/ROS  LIMITATION TO HISTORY   Select: : None  OUTSIDE HISTORIAN(S):  Patient's mother is in the room    Annabel Ramírez is a 24 y.o. female who presents stating that she has severe right sided abdominal pain since 6 PM last night.  She has had nausea with no vomiting.  She denies diarrhea.  She reports he is never had pain like this before.    PAST MEDICAL HISTORY   has a past medical history of Eczema and Scoliosis.    SURGICAL HISTORY   has a past surgical history that includes dental extraction(s) and dental extraction(s).    FAMILY HISTORY  Family History   Problem Relation Age of Onset    Breast Cancer Mother 56    No Known Problems Father     No Known Problems Brother     Drug abuse Paternal Uncle     Alcohol abuse Paternal Uncle     Hypertension Maternal Grandmother     Dementia Maternal Grandfather         Alzheimer’s    Cancer Paternal Grandmother         Brain cancer, early 30s    Cancer Paternal Grandfather         Lung cancer, 60s-70s       SOCIAL HISTORY  Social History     Tobacco Use    Smoking status: Never    Smokeless tobacco: Never   Vaping Use    Vaping status: Never Used   Substance and Sexual Activity    Alcohol use: Yes     Comment: 1-2 a month    Drug use: Never    Sexual activity: Yes     Partners: Male     Birth control/protection: I.U.D.     Comment: Mirena       CURRENT MEDICATIONS  Home Medications       Reviewed by Rosa Schultz R.N. (Registered Nurse) on 11/03/24 at 1045  Med List Status: Partial     Medication Last Dose Status   betamethasone dipropionate (DEL-BETA) 0.05 % Ointment  Active   levonorgestrel (MIRENA, 52 MG,) 52 mg (20 mcg/24 hr) IUD  Active                      "      ALLERGIES  Allergies   Allergen Reactions    Latex        PHYSICAL EXAM  VITAL SIGNS: /59   Pulse 88   Temp 36.7 °C (98.1 °F) (Temporal)   Resp 16   Ht 1.676 m (5' 6\")   Wt 64.8 kg (142 lb 13.7 oz)   SpO2 93%   BMI 23.06 kg/m²      Constitutional: Alert.  HENT: No signs of trauma, Bilateral external ears normal, Nose normal.   Eyes: Pupils are equal and reactive, Conjunctiva normal, Non-icteric.   Neck: Normal range of motion, No tenderness, Supple, No stridor.   Lymphatic: No lymphadenopathy noted.   Cardiovascular: Regular rate and rhythm, no murmurs.   Thorax & Lungs: Normal breath sounds, No respiratory distress, No wheezing, No chest tenderness.   Abdomen: Bowel sounds normal, Soft, right-sided tenderness with no peritoneal signs.  Skin: Warm, Dry, No erythema, No rash.   Back: No bony tenderness, No CVA tenderness.   Extremities: Intact distal pulses, No edema, No tenderness, No cyanosis.  Musculoskeletal: Good range of motion in all major joints. No tenderness to palpation or major deformities noted.   Neurologic: Alert , Normal motor function, Normal sensory function, No focal deficits noted.   Psychiatric: Affect normal, Judgment normal, Mood normal.       LABS        Labs Reviewed   COMP METABOLIC PANEL - Abnormal; Notable for the following components:       Result Value    Glucose 107 (*)     All other components within normal limits   URINALYSIS,CULTURE IF INDICATED - Abnormal; Notable for the following components:    Character Turbid (*)     Leukocyte Esterase Small (*)     All other components within normal limits   URINE MICROSCOPIC (W/UA) - Abnormal; Notable for the following components:    WBC 21-50 (*)     RBC 6-10 (*)     Bacteria Many (*)     All other components within normal limits   CBC WITH DIFFERENTIAL   LIPASE   HCG QUAL SERUM   ESTIMATED GFR   URINE CULTURE(NEW)         RADIOLOGY/PROCEDURES   I have independently interpreted the diagnostic imaging associated with this " visit and am waiting the final reading from the radiologist.   My preliminary interpretation is as follows: No free air in abdomen or pelvis    Radiologist interpretation:  CT-ABDOMEN-PELVIS WITH   Final Result      1.  Significant amount of hemorrhagic fluid, centered within the right pelvis/adnexa suggesting hemorrhagic/ruptured ovarian lesion. There is fluid tracking into the upper abdomen.   2.  Appendix is likely partially visualized and normal.   3.  IUD is in place.          COURSE & MEDICAL DECISION MAKING    ASSESSMENT, COURSE AND PLAN  Care Narrative:     Patient presents with right-sided abdominal pain over the last 12 hours.  CT scan was ordered to evaluate for possible appendicitis.  Labs ordered.  The patient was offered pain medication but she would not like any at this time.      11:46 AM the patient's white blood count is normal.  Electrolytes are normal.  LFTs are normal.  Lipase is normal.  hCG is negative for pregnancy.  UA and CT scan are pending.    1:04 PM the patient has a negative pregnancy test, normal white count 8.4.  H&H normal 13 and 41.  The patient's CMP is unremarkable.  CT scan is consistent with a large amount of hemorrhagic fluid likely from a ruptured ovarian lesion.  At this point the patient is not having urinary tract infection symptoms is a healthy 24-year-old.  I have sent the urine for culture.                ADDITIONAL PROBLEMS MANAGED  Intraperitoneal hemorrhage, likely from ovarian cyst    DISPOSITION AND DISCUSSIONS  I have discussed management of the patient with the following physicians and BOLA's: I spoke with Dr. Cortes on-call for Dr. Antonio.  He reviewed the CT scan, he reviewed the timeframe when the patient's pain began, her vital signs and her labs.  At this point he believes that her probable be self-limited and she is discharged to follow-up in Dr. Antonio's office, to return for worsening symptoms.    Discussion of management with other \[facility]\"" or appropriate  source(s): None     Escalation of care considered, and ultimately not performed:acute inpatient care management, however at this time, the patient is most appropriate for outpatient management    Barriers to care at this time, including but not limited to:  None .     Decision tools and prescription drugs considered including, but not limited to: Pain Medications I prescribed the patient Percocet .      I reviewed prescription monitoring program for patient's narcotic use before prescribing a scheduled drug.The patient will not drink alcohol nor drive with prescribed medications. The patient will return for new or worsening symptoms and is stable at the time of discharge.    The patient is referred to a primary physician for blood pressure management, diabetic screening, and for all other preventative health concerns.    In prescribing controlled substances to this patient, I certify that I have obtained and reviewed the medical history of Annabel Ramírez. I have also made a good alfa effort to obtain applicable records from other providers who have treated the patient and records did not demonstrate any increased risk of substance abuse that would prevent me from prescribing controlled substances.     I have conducted a physical exam and documented it. I have reviewed Ms. Ramírez’s prescription history as maintained by the [place] Prescription Monitoring Program.     I have assessed the patient’s risk for abuse, dependency, and addiction using the validated Opioid Risk Tool available at https://www.mdcalc.com/pcpzxe-nmcz-btbs-ort-narcotic-abuse.     Given the above, I believe the benefits of controlled substance therapy outweigh the risks. The reasons for prescribing controlled substances include non-narcotic, oral analgesic alternatives have been inadequate for pain control. Accordingly, I have discussed the risk and benefits, treatment plan, and alternative therapies with the patient.        DISPOSITION:  Patient will be discharged home in stable condition.    FOLLOW UP:  [facility], Emergency Dept  [address]  [place] [address] [phone]    If symptoms worsen    China Antonio M.D.  [address] [place]  [facility] [id number] [phone]      Call for follow-up      OUTPATIENT MEDICATIONS:  New Prescriptions    OXYCODONE-ACETAMINOPHEN (PERCOCET) 5-325 MG TAB    Take 1 Tablet by mouth every four hours as needed (pain) for up to 5 days. No driving, no drinking alcohol         FINAL DIAGNOSIS  1. Intraperitoneal hemorrhage             Likely from ruptured hemorrhagic ovarian cyst     Electronically signed by: Alvino Tucker M.D., 11/3/2024 11:27 AM

## 2024-11-05 LAB
BACTERIA UR CULT: ABNORMAL
SIGNIFICANT IND 70042: ABNORMAL
SITE SITE: ABNORMAL
SOURCE SOURCE: ABNORMAL

## 2024-11-07 NOTE — ED NOTES
"ED Positive Culture Follow-up/Notification Note:    Date: 11/7/2024     Patient seen in the ED on 11/3/2024 for severe right-sided abdominal pain x 1 day. She denied nausea, vomiting, or diarrhea. Physical exam was unremarkable. CT of the abdomen/pelvis showed significant amount of hemorrhagic fluid, centered within the right pelvis/adnexa suggesting hemorrhagic/ruptured ovarian lesion, and fluid tracking into the upper abdomen. Serum beta hcg was negative.   1. Intraperitoneal hemorrhage       Discharge Medication List as of 11/3/2024  2:04 PM        START taking these medications    Details   oxyCODONE-acetaminophen (PERCOCET) 5-325 MG Tab Take 1 Tablet by mouth every four hours as needed (pain) for up to 5 days. No driving, no drinking alcohol, Disp-20 Tablet, R-0, Normal           Allergies: Latex     Vitals:    11/03/24 1042 11/03/24 1108 11/03/24 1303 11/03/24 1401   BP:  120/59 105/59 111/57   Pulse:  86 88 80   Resp:    14   Temp:    36.8 °C (98.2 °F)   TempSrc:    Temporal   SpO2:  97% 93% 94%   Weight: 64.8 kg (142 lb 13.7 oz)      Height: 1.676 m (5' 6\")        Final cultures:   Results       Procedure Component Value Units Date/Time    URINE CULTURE(NEW) [[id number]]  (Abnormal)  (Susceptibility) Collected: 11/03/24 1109    Order Status: Completed Specimen: Urine Updated: 11/05/24 0925     Significant Indicator POS     Source UR     Site -     Culture Result -      Enterococcus faecalis  >100,000 cfu/mL        Escherichia coli  ,000 cfu/mL      Susceptibility       Enterococcus faecalis (1)       Antibiotic Interpretation Microscan   Method Status    Ampicillin Sensitive <=2 mcg/mL EMILIE Final    Ciprofloxacin  [*]  Sensitive <=1 mcg/mL EMILIE Final    Daptomycin Sensitive 2 mcg/mL EMILIE Final    Nitrofurantoin Sensitive <=32 mcg/mL EMILIE Final    Gent Synergy  [*]  Sensitive <=500 mcg/mL EMILIE Final    Levofloxacin  [*]  Sensitive <=1 mcg/mL EMILIE Final    Linezolid  [*]  Sensitive 2 mcg/mL EMILIE Final    " Penicillin  [*]  Sensitive 2 mcg/mL EMILIE Final    Rifampin  [*]  Sensitive <=1 mcg/mL EMILIE Final    Strep Synergy  [*]  Sensitive <=1000 mcg/mL EMILIE Final    Tetracycline Resistant >8 mcg/mL EMILIE Final    Tigecycline  [*]  Sensitive <=0.25 mcg/mL EMILIE Final    Vancomycin Sensitive 2 mcg/mL EMILIE Final              Escherichia coli (2)       Antibiotic Interpretation Microscan   Method Status    Ampicillin Sensitive <=8 mcg/mL EMILIE Final    Ciprofloxacin Sensitive <=0.25 mcg/mL EMILIE Final    Nitrofurantoin Sensitive <=32 mcg/mL EMILIE Final    Levofloxacin Sensitive <=0.5 mcg/mL EMILIE Final    Tetracycline  [*]  Sensitive <=4 mcg/mL EMILIE Final    Tigecycline Sensitive <=2 mcg/mL EMILIE Final    Ampicillin/sulbactam Sensitive <=4/2 mcg/mL EMILIE Final    Amikacin  [*]  Sensitive <=16 mcg/mL EMILIE Final    Amoxicillin/CA Sensitive <=8/4 mcg/mL EMILIE Final    Aztreonam  [*]  Sensitive <=4 mcg/mL EMILIE Final    Ceftolozane+Tazobactam  [*]  Sensitive <=2 mcg/mL EMILIE Final    Ceftriaxone Sensitive <=1 mcg/mL EMILIE Final    Ceftazidime  [*]  Sensitive <=1 mcg/mL EMILIE Final    Cefazolin Sensitive <=2 mcg/mL EMILIE Final     Breakpoints when Cefazolin is used for therapy of infections  other than uncomplicated UTIs due to Enterobacterales are as  follows:  EMILIE and Interpretation:  <=2 S  4 I  >=8 R         Cefepime Sensitive <=2 mcg/mL EMILIE Final    Cefuroxime Sensitive <=4 mcg/mL EMIILE Final    Ceftazidime+Avibactam  [*]  Sensitive <=4 mcg/mL EMILIE Final    Ertapenem  [*]  Sensitive <=0.5 mcg/mL EMILIE Final    Gentamicin Sensitive <=2 mcg/mL EMILIE Final    Imipenem  [*]  Sensitive <=1 mcg/mL EMILIE Final    Meropenem Sensitive <=1 mcg/mL EMILIE Final    Meropenem/Vaborbactam Sensitive <=2 mcg/mL EMILIE Final    Minocycline Sensitive <=4 mcg/mL EMILIE Final    Pip/Tazobactam Sensitive <=8 mcg/mL EMILIE Final    Trimeth/Sulfa Sensitive <=0.5/9.5 mcg/mL EMILIE Final    Tobramycin Sensitive <=2 mcg/mL EMILIE Final               [*]  Suppressed Antibiotic                   URINALYSIS,CULTURE  IF INDICATED [[id number]]  (Abnormal) Collected: 11/03/24 1109    Order Status: Completed Specimen: Urine, Clean Catch Updated: 11/03/24 1155     Color Yellow     Character Turbid     Specific Gravity 1.030     Ph 7.0     Glucose Negative mg/dL      Ketones Negative mg/dL      Protein Negative mg/dL      Bilirubin Negative     Urobilinogen, Urine 1.0 EU/dL      Nitrite Negative     Leukocyte Esterase Small     Occult Blood Negative     Micro Urine Req Microscopic            Plan:   Patient did not have symptoms of a UTI. Polymicrobial growth of E. Coli and E. Faecalis isolated in the urine culture likely represents contamination and asymptomatic bacteriuria instead of a true urinary tract infection. No treatment is indicated at this time.     Susy Babb, RichardD

## 2024-11-22 ENCOUNTER — HOME STUDY (OUTPATIENT)
Dept: SLEEP MEDICINE | Facility: MEDICAL CENTER | Age: 24
End: 2024-11-22
Attending: BEHAVIOR ANALYST
Payer: COMMERCIAL

## 2024-11-22 DIAGNOSIS — G47.33 OBSTRUCTIVE SLEEP APNEA SYNDROME: ICD-10-CM

## 2024-11-22 DIAGNOSIS — R40.0 DAYTIME SLEEPINESS: ICD-10-CM

## 2024-11-22 PROCEDURE — 95800 SLP STDY UNATTENDED: CPT | Performed by: STUDENT IN AN ORGANIZED HEALTH CARE EDUCATION/TRAINING PROGRAM

## 2024-12-02 NOTE — PROCEDURES
DIAGNOSTIC HOME SLEEP TEST (HST) REPORT WatchPAT      PATIENT ID:  NAME:  Annabel Ramírez  MRN:               [id number]  YOB: 2000  DATE OF STUDY: 11/23/2024      Impression:     This study shows evidence of:      1. Mild obstructive sleep apnea with PAT apnea hypopnea index(pAHI) of 8.3 per hour.  PAT respiratory disturbance index (pRDI) was 14.3 per hour. These findings are based on 7 channels recording of PAT signal with sleep staging, heart rate, pulse oximetry, actigraphy, body position, snoring and respiratory movement.     2. Oxygenation O2 Sat. mean O2 sat was 93%,  cari was 87%,  and maximum O2 at 97 %. O2 sat was at or  below 88% for 0.1 min of evaluation time. Oxygen Desaturation (>=4%) Index was 2.4/hr. AVG HR was 63 BPM.      TECHNICAL DESCRIPTION: Patient underwent home sleep apnea testing with peripheral arterial tone signal (WatchPAT™). This is a Type IV portable monitor and device per Medicare. Monitoring was done with 7 channels recording of PAT signal with sleep staging, heart rate, pulse oximetry, actigraphy, body position, snoring and respiratory movement. Prior to using the device, the patient received verbal and written instructions for its application and was provided with the help desk phone number for additional telephonic instruction with 24-hour availability of qualified personnel to answer questions.    Respiratory events:          General sleep summary: . Total recording time is 9 hours and 8 minutes and total Sleep time is 8 hours and 43 minutes. The patient spent 341 minutes in the supine position and 182 minutes in the nonsupine position.      Recommendations:    1. CPAP titration study vs Auto CPAP trial.  If starting auto CPAP would recommend minimum pressure of 4 cmH2O maximum pressure 10 cmH2O  2. In general patients with sleep apnea are advised to avoid alcohol and sedatives and to not operate a motor vehicle while drowsy. In some cases alternative  treatment options may prove effective in resolving sleep apnea in these options include upper airway surgery, the use of a dental orthotic or weight loss and positional therapy. Clinical correlation is required.         Kee Owens MD

## 2024-12-17 ENCOUNTER — TELEPHONE (OUTPATIENT)
Dept: MEDICAL GROUP | Facility: MEDICAL CENTER | Age: 24
End: 2024-12-17
Payer: COMMERCIAL

## 2024-12-17 NOTE — TELEPHONE ENCOUNTER
Left message for patient to review results and to let us know what she would like to do.               ----- Message from Nurse Practitioner MONICA Ortiz sent at 12/15/2024 11:18 PM PST -----  It does not look like the patient has reviewed this [employer] message.  Please call the patient and ask her to review the result note in [email or web address] so she can let me know what she would like to do.

## 2025-01-07 ENCOUNTER — PATIENT MESSAGE (OUTPATIENT)
Dept: MEDICAL GROUP | Facility: MEDICAL CENTER | Age: 25
End: 2025-01-07
Payer: COMMERCIAL

## 2025-01-07 DIAGNOSIS — G47.33 OBSTRUCTIVE SLEEP APNEA SYNDROME: ICD-10-CM

## 2025-04-10 ENCOUNTER — OFFICE VISIT (OUTPATIENT)
Dept: MEDICAL GROUP | Facility: MEDICAL CENTER | Age: 25
End: 2025-04-10
Payer: COMMERCIAL

## 2025-04-10 VITALS
HEIGHT: 66 IN | OXYGEN SATURATION: 98 % | HEART RATE: 75 BPM | WEIGHT: 145 LBS | BODY MASS INDEX: 23.3 KG/M2 | SYSTOLIC BLOOD PRESSURE: 118 MMHG | TEMPERATURE: 98.3 F | DIASTOLIC BLOOD PRESSURE: 70 MMHG

## 2025-04-10 DIAGNOSIS — J30.2 SEASONAL ALLERGIES: ICD-10-CM

## 2025-04-10 DIAGNOSIS — G47.33 OBSTRUCTIVE SLEEP APNEA: ICD-10-CM

## 2025-04-10 DIAGNOSIS — G47.19 EXCESSIVE DAYTIME SLEEPINESS: ICD-10-CM

## 2025-04-10 PROCEDURE — 99214 OFFICE O/P EST MOD 30 MIN: CPT | Performed by: BEHAVIOR ANALYST

## 2025-04-10 PROCEDURE — 3074F SYST BP LT 130 MM HG: CPT | Performed by: BEHAVIOR ANALYST

## 2025-04-10 PROCEDURE — 3078F DIAST BP <80 MM HG: CPT | Performed by: BEHAVIOR ANALYST

## 2025-04-10 RX ORDER — MODAFINIL 100 MG/1
200 TABLET ORAL DAILY
Qty: 60 TABLET | Refills: 0 | Status: SHIPPED | OUTPATIENT
Start: 2025-04-10 | End: 2025-04-11 | Stop reason: SDUPTHER

## 2025-04-10 ASSESSMENT — PATIENT HEALTH QUESTIONNAIRE - PHQ9: CLINICAL INTERPRETATION OF PHQ2 SCORE: 0

## 2025-04-10 ASSESSMENT — FIBROSIS 4 INDEX: FIB4 SCORE: 0.6

## 2025-04-11 ENCOUNTER — PATIENT MESSAGE (OUTPATIENT)
Dept: MEDICAL GROUP | Facility: MEDICAL CENTER | Age: 25
End: 2025-04-11
Payer: COMMERCIAL

## 2025-04-11 DIAGNOSIS — G47.19 EXCESSIVE DAYTIME SLEEPINESS: ICD-10-CM

## 2025-04-11 NOTE — TELEPHONE ENCOUNTER
Received request via: Patient    Was the patient seen in the last year in this department? Yes    Does the patient have an active prescription (recently filled or refills available) for medication(s) requested?  PLEASE SEND TO [facility], SHE WILL GET WITH its learning     Pharmacy Name: [facility]    Does the patient have [facility] and need 100-day supply? (This applies to ALL medications) Patient does not have SCP

## 2025-04-11 NOTE — PROGRESS NOTES
Subjective:   Verbal consent was acquired by the patient to use [employer] ambient listening note generation during this visit Yes    CC:    Chief Complaint   Patient presents with    Follow-Up     CPAP follow up           HISTORY OF THE PRESENT ILLNESS:   Annabel presents today   History of Present Illness  The patient is a 24-year-old female who came in for a follow-up on her sleep apnea and CPAP therapy.    She hasn't noticed any significant improvement in her daytime sleepiness even though she's been using the CPAP machine regularly. She started using the device on February 26, 2025, and continued until March 20, 2025. She then stopped using it during a 1.5-week trip to [facility] because she didn't bring it with her. Since returning, she's been using it every day but still doesn't feel much better. Her peak events per hour were 2.5 last month, but recently they've gone up to 5.5, 7.5, and 8.5 over the past few nights, even with the CPAP on. She thinks this might be because she's been dealing with allergies and coughing.   Even before these issues, she didn't feel well-rested and has trouble waking up.  She also uses a mouthguard and isn't sure if it's okay to use it at the same time as the CPAP machine. She has an appointment with pulmonary medicine in July 2025.     She continues to feel sleepy during the day, often falling asleep in classes and sometimes while driving, which makes her pull over to take a nap. She doesn't have any emotional ups and downs, muscle weakness, grimacing, or tremors. She has vivid dreams related to what she's doing at the time that she falls asleep but no hallucinations or paralysis. She's looked into narcolepsy and does have many of the symptoms. She often falls asleep during tests because of sitting quietly for long periods, a problem she's had since high school. She doesn't drink caffeine and hasn't noticed any big changes when she did, except for one time when she had diarrhea from  "it.    She's been dealing with allergies and coughing, which might be affecting her sleep. She hasn't taken any allergy medicine because she initially thought she had a cold and was just using cough medicine.      Current Outpatient Medications   Medication Sig    modafinil (PROVIGIL) 100 MG Tab Take 2 Tablets by mouth every day for 35 days. Take 1 tablet once daily for 7 days and increase to 2 tablets as tolerated.    levonorgestrel (MIRENA, 52 MG,) 52 mg (20 mcg/24 hr) IUD 1 Each by Intrauterine route one time.    betamethasone dipropionate (DEL-BETA) 0.05 % Ointment Apply a thin layer to rash BID PRN. Do not use longer than 14 days straight. (Patient not taking: Reported on 4/10/2025)          ROS: See HPI        Objective:     Exam: /70 (BP Location: Right arm, Patient Position: Sitting, BP Cuff Size: Adult)   Pulse 75   Temp 36.8 °C (98.3 °F) (Temporal)   Ht 1.676 m (5' 6\")   Wt 65.8 kg (145 lb)   SpO2 98%   BMI 23.40 kg/m²   Body mass index is 23.4 kg/m².    Physical Exam  Constitutional:       Appearance: Normal appearance.   Cardiovascular:      Rate and Rhythm: Normal rate and regular rhythm.      Pulses: Normal pulses.      Heart sounds: Normal heart sounds.   Pulmonary:      Effort: Pulmonary effort is normal.      Breath sounds: Normal breath sounds.   Musculoskeletal:      Cervical back: Normal range of motion and neck supple.   Neurological:      Mental Status: She is alert.       Admission on 11/03/2024, Discharged on 11/03/2024   Component Date Value Ref Range Status    WBC 11/03/2024 8.4  4.8 - 10.8 K/uL Final    RBC 11/03/2024 4.43  4.20 - 5.40 M/uL Final    Hemoglobin 11/03/2024 13.6  12.0 - 16.0 g/dL Final    Hematocrit 11/03/2024 41.4  37.0 - 47.0 % Final    MCV 11/03/2024 93.5  81.4 - 97.8 fL Final    MCH 11/03/2024 30.7  27.0 - 33.0 pg Final    MCHC 11/03/2024 32.9  32.2 - 35.5 g/dL Final    RDW 11/03/2024 40.1  35.9 - 50.0 fL Final    Platelet Count 11/03/2024 221  164 - 446 K/uL " Final    MPV 11/03/2024 9.5  9.0 - 12.9 fL Final    Neutrophils-Polys 11/03/2024 63.90  44.00 - 72.00 % Final    Lymphocytes 11/03/2024 23.80  22.00 - 41.00 % Final    Monocytes 11/03/2024 9.70  0.00 - 13.40 % Final    Eosinophils 11/03/2024 1.80  0.00 - 6.90 % Final    Basophils 11/03/2024 0.40  0.00 - 1.80 % Final    Immature Granulocytes 11/03/2024 0.40  0.00 - 0.90 % Final    Nucleated RBC 11/03/2024 0.00  0.00 - 0.20 /100 WBC Final    Neutrophils (Absolute) 11/03/2024 5.36  1.82 - 7.42 K/uL Final    Includes immature neutrophils, if present.    Lymphs (Absolute) 11/03/2024 1.99  1.00 - 4.80 K/uL Final    Monos (Absolute) 11/03/2024 0.81  0.00 - 0.85 K/uL Final    Eos (Absolute) 11/03/2024 0.15  0.00 - 0.51 K/uL Final    Baso (Absolute) 11/03/2024 0.03  0.00 - 0.12 K/uL Final    Immature Granulocytes (abs) 11/03/2024 0.03  0.00 - 0.11 K/uL Final    NRBC (Absolute) 11/03/2024 0.00  K/uL Final    Sodium 11/03/2024 141  135 - 145 mmol/L Final    Potassium 11/03/2024 4.1  3.6 - 5.5 mmol/L Final    Chloride 11/03/2024 109  96 - 112 mmol/L Final    Co2 11/03/2024 24  20 - 33 mmol/L Final    Anion Gap 11/03/2024 8.0  7.0 - 16.0 Final    Glucose 11/03/2024 107 (H)  65 - 99 mg/dL Final    Bun 11/03/2024 17  8 - 22 mg/dL Final    Creatinine 11/03/2024 0.76  0.50 - 1.40 mg/dL Final    Calcium 11/03/2024 9.1  8.5 - 10.5 mg/dL Final    Correct Calcium 11/03/2024 8.9  8.5 - 10.5 mg/dL Final    AST(SGOT) 11/03/2024 16  12 - 45 U/L Final    ALT(SGPT) 11/03/2024 7  2 - 50 U/L Final    Alkaline Phosphatase 11/03/2024 55  30 - 99 U/L Final    Total Bilirubin 11/03/2024 0.9  0.1 - 1.5 mg/dL Final    Albumin 11/03/2024 4.2  3.2 - 4.9 g/dL Final    Total Protein 11/03/2024 6.8  6.0 - 8.2 g/dL Final    Globulin 11/03/2024 2.6  1.9 - 3.5 g/dL Final    A-G Ratio 11/03/2024 1.6  g/dL Final    Lipase 11/03/2024 24  11 - 82 U/L Final    Beta-Hcg Qualitative Serum 11/03/2024 Negative  Negative Final    Color 11/03/2024 Yellow   Final     Character 11/03/2024 Turbid (A)   Final    Specific Gravity 11/03/2024 1.030  <1.035 Final    Ph 11/03/2024 7.0  5.0 - 8.0 Final    Glucose 11/03/2024 Negative  Negative mg/dL Final    Ketones 11/03/2024 Negative  Negative mg/dL Final    Protein 11/03/2024 Negative  Negative mg/dL Final    Bilirubin 11/03/2024 Negative  Negative Final    Urobilinogen, Urine 11/03/2024 1.0  <=1.0 EU/dL Final    Nitrite 11/03/2024 Negative  Negative Final    Leukocyte Esterase 11/03/2024 Small (A)  Negative Final    Occult Blood 11/03/2024 Negative  Negative Final    Micro Urine Req 11/03/2024 Microscopic   Final    WBC 11/03/2024 21-50 (A)  /hpf Final    Comment: Female  <12 Yr 0-2  >12 Yr 0-5  Male   0-2      RBC 11/03/2024 6-10 (A)  0 - 2 /hpf Final    Bacteria 11/03/2024 Many (A)  None /hpf Final    Epithelial Cells 11/03/2024 0-2  0 - 5 /hpf Final    Please note new reference range effective 10/23/2024.    Urine Casts 11/03/2024 0-2  0 - 2 /lpf Final    GFR (CKD-EPI) 11/03/2024 113  >60 mL/min/1.73 m 2 Final    Comment: Estimated Glomerular Filtration Rate is calculated using  race neutral CKD-EPI 2021 equation per NKF-ASN recommendations.      Significant Indicator 11/03/2024 POS (POS)   Final    Source 11/03/2024 UR   Final    Site 11/03/2024 -   Final    Culture Result 11/03/2024 - (A)   Final    Culture Result 11/03/2024  (A)   Final                    Value:Enterococcus faecalis  >100,000 cfu/mL      Culture Result 11/03/2024  (A)   Final                    Value:Escherichia coli  ,000 cfu/mL          Assessment & Plan:     24 y.o. female with the following -     1. Obstructive sleep apnea        2. Excessive daytime sleepiness  modafinil (PROVIGIL) 100 MG Tab    Controlled Substance Treatment Agreement        Assessment & Plan  Sleep Apnea  - Chronic, now treated but uncertain control.  - She reports no issues with the fit of the CPAP mask and does not find it bothersome during sleep.  - She will continue using the  CPAP device for the next few months until her appointment with the sleep specialist in July 2025.  - If high AHI readings persist, adjustments to the CPAP settings may be necessary.    Allergies  - She reports experiencing allergies and coughing, which may be contributing to her sleep disturbances.  - She is advised to use a saline nasal mist to clear her sinuses of allergens, followed by Flonase once daily.  - The saline spray can be used more frequently if needed.    Daytime Sleepiness  - chronic, uncontrolled. Possible narcolepsy?  - She frequently falls asleep during tests due to prolonged periods of sitting quietly, a problem that has persisted since high school.  - Modafinil 100 mg tablets have been prescribed, with instructions to take one tablet daily for a week, then increase to two tablets daily if well-tolerated.  - A controlled substance agreement form has been completed.    - Controlled substance and risks of taking medication discussed with client.  Client agrees to abide by controlled substance contract.  - PDMP reviewed- shows no abnormal prescribing or filling behavior.    - The treatment plan was reviewed and discussed with the patient. The pharmacy monitoring report was requested and reviewed.  Patient is appropriate for refill of this medication.  - Patient informed no medication adjustments will be made to titrate up or add additional narcotics, benzodiazepines or other controlled substances to this regimen. - Referral to pain management or behavioral health will be made as appropriate.    Controlled Substance agreement updated- TODAY       Follow-up: The patient will follow up in 5 weeks.        No follow-ups on file.    Please note that this dictation was created using voice recognition software. I have made every reasonable attempt to correct obvious errors, but I expect that there are errors of grammar and possibly content that I did not discover before finalizing the note.

## 2025-04-13 RX ORDER — MODAFINIL 100 MG/1
200 TABLET ORAL DAILY
Qty: 70 TABLET | Refills: 0 | Status: SHIPPED | OUTPATIENT
Start: 2025-04-13 | End: 2025-04-21 | Stop reason: SDUPTHER

## 2025-04-17 ENCOUNTER — APPOINTMENT (OUTPATIENT)
Dept: RADIOLOGY | Facility: OTHER | Age: 25
End: 2025-04-17
Attending: FAMILY MEDICINE
Payer: COMMERCIAL

## 2025-04-17 DIAGNOSIS — R05.1 ACUTE COUGH: ICD-10-CM

## 2025-04-17 PROCEDURE — 71046 X-RAY EXAM CHEST 2 VIEWS: CPT | Mod: TC,FY | Performed by: FAMILY MEDICINE

## 2025-04-18 ENCOUNTER — PATIENT MESSAGE (OUTPATIENT)
Dept: MEDICAL GROUP | Facility: MEDICAL CENTER | Age: 25
End: 2025-04-18
Payer: COMMERCIAL

## 2025-04-18 DIAGNOSIS — G47.19 EXCESSIVE DAYTIME SLEEPINESS: ICD-10-CM

## 2025-04-21 ENCOUNTER — TELEPHONE (OUTPATIENT)
Dept: MEDICAL GROUP | Facility: MEDICAL CENTER | Age: 25
End: 2025-04-21
Payer: COMMERCIAL

## 2025-04-21 RX ORDER — MODAFINIL 100 MG/1
TABLET ORAL
Qty: 67 TABLET | Refills: 0 | Status: SHIPPED | OUTPATIENT
Start: 2025-04-21 | End: 2025-05-28

## 2025-04-21 NOTE — TELEPHONE ENCOUNTER
DOCUMENTATION OF PAR STATUS:    1. Name of Medication & Dose: MODAFINIL      2. Name of Prescription Coverage Company & phone #: [employer]   KEY: [id number]  SEND TO PLAN VIA MY COVER MY MEDS.     3. Date Prior Auth Submitted: 04/21/2025    4. What information was given to obtain insurance decision? ICD 10 CODES     5. Prior Auth Status? Pending    6. Patient Notified: N\A

## 2025-05-08 ENCOUNTER — TELEPHONE (OUTPATIENT)
Dept: MEDICAL GROUP | Facility: MEDICAL CENTER | Age: 25
End: 2025-05-08
Payer: COMMERCIAL

## 2025-05-08 NOTE — TELEPHONE ENCOUNTER
FINAL PRIOR AUTHORIZATION STATUS:    1.  Name of Medication & Dose: MODAFINIL      2. Prior Auth Status: Denied.  Reason: LETTER SCAN TO MEDIA     3. Action Taken: Pharmacy Notified: N\A Patient Notified: yes

## 2025-05-16 ENCOUNTER — OFFICE VISIT (OUTPATIENT)
Dept: MEDICAL GROUP | Facility: MEDICAL CENTER | Age: 25
End: 2025-05-16
Payer: COMMERCIAL

## 2025-05-16 VITALS
WEIGHT: 145 LBS | HEIGHT: 66 IN | HEART RATE: 92 BPM | TEMPERATURE: 98.4 F | OXYGEN SATURATION: 95 % | BODY MASS INDEX: 23.3 KG/M2 | SYSTOLIC BLOOD PRESSURE: 118 MMHG | DIASTOLIC BLOOD PRESSURE: 70 MMHG

## 2025-05-16 DIAGNOSIS — R40.0 DAYTIME SLEEPINESS: Primary | ICD-10-CM

## 2025-05-16 DIAGNOSIS — G47.33 OBSTRUCTIVE SLEEP APNEA SYNDROME: ICD-10-CM

## 2025-05-16 DIAGNOSIS — R44.2 HYPNAGOGIC HALLUCINATIONS: ICD-10-CM

## 2025-05-16 PROCEDURE — 99214 OFFICE O/P EST MOD 30 MIN: CPT | Performed by: BEHAVIOR ANALYST

## 2025-05-16 PROCEDURE — 3078F DIAST BP <80 MM HG: CPT | Performed by: BEHAVIOR ANALYST

## 2025-05-16 PROCEDURE — 3074F SYST BP LT 130 MM HG: CPT | Performed by: BEHAVIOR ANALYST

## 2025-05-16 ASSESSMENT — FIBROSIS 4 INDEX: FIB4 SCORE: 0.6

## 2025-05-16 NOTE — LETTER
[facility]  FIORDALIZA Chowdhury.  [address]  [place] [address]  Fax: [phone]   Authorization for Release/Disclosure of   Protected Health Information   Name: BRITNEY MUJICA : 2000 SSN: [id number]   Address: [address] [place] [id number] Phone:    [phone] (home) [phone] (work)   I authorize the entity listed below to release/disclose the PHI below to:   [facility]/MONICA Chowdhury and MONICA Chowdhury   Provider or Entity Name:  [facility]    Address   City, State, Zip   Phone:      Fax:     Reason for request: continuity of care   Information to be released:    [  ] LAST COLONOSCOPY,  including any PATH REPORT and follow-up  [  ] LAST FIT/COLOGUARD RESULT [  ] LAST DEXA  [  ] LAST MAMMOGRAM  [ XXX ] LAST PAP  [  ] LAST LABS [  ] RETINA EXAM REPORT  [  ] IMMUNIZATION RECORDS  [  ] Release all info      [  ] Check here and initial the line next to each item to release ALL health information INCLUDING  _____ Care and treatment for drug and / or alcohol abuse  _____ HIV testing, infection status, or AIDS  _____ Genetic Testing    DATES OF SERVICE OR TIME PERIOD TO BE DISCLOSED: _____________  I understand and acknowledge that:  * This Authorization may be revoked at any time by you in writing, except if your health information has already been used or disclosed.  * Your health information that will be used or disclosed as a result of you signing this authorization could be re-disclosed by the recipient. If this occurs, your re-disclosed health information may no longer be protected by State or Federal laws.  * You may refuse to sign this Authorization. Your refusal will not affect your ability to obtain treatment.  * This Authorization becomes effective upon signing and will  on (date) __________.      If no date is indicated, this Authorization will  one (1) year from the signature date.    Name: Britney Rich  Darrell  Signature: Date:   5/16/2025     PLEASE FAX REQUESTED RECORDS BACK TO: [phone]

## 2025-05-16 NOTE — PROGRESS NOTES
"Subjective:     CC:    Chief Complaint   Patient presents with    Follow-Up     5 week follow up- med check and symptom check.           HISTORY OF THE PRESENT ILLNESS:   Annabel presents today with    Had some issues obtaining rx for modafinil-denied by insurance. Officially started modafanil about 4/24.   She reports she did not feel any different with the modafinil.   At first she was more consistent with taking medication daily but would not feel any less sleepy during the day.   While taking modafinil she did not fall asleep during any of her final exams which she was worried about.   Denies any adverse side effects.   Since she did not feel much different, she has stopped taking it regularly.  She got a new dental appliance for sleep apnea from her dentist about a week ago. Unsure if this has helped.      She has a CPAP machine but uses it inconsistently.   She spends about half the nights at home and half the nights at her boyfriend's house.  She always uses her CPAP machine the nights she sleeps at home but does not take it back and forth with her boyfriend's house.  She is wondering if she can purchase another machine to keep up both places.       Current Outpatient Medications   Medication Sig    modafinil (PROVIGIL) 100 MG Tab Take 1 Tablet by mouth every day for 7 days, THEN 2 Tablets every day for 30 days. Take 1 tablet once daily for 7 days and increase to 2 tablets as tolerated.    levonorgestrel (MIRENA, 52 MG,) 52 mg (20 mcg/24 hr) IUD 1 Each by Intrauterine route one time.    betamethasone dipropionate (DEL-BETA) 0.05 % Ointment Apply a thin layer to rash BID PRN. Do not use longer than 14 days straight. (Patient not taking: Reported on 5/16/2025)          ROS: See HPI        Objective:     Exam: /70 (BP Location: Right arm, Patient Position: Sitting, BP Cuff Size: Adult)   Pulse 92   Temp 36.9 °C (98.4 °F) (Temporal)   Ht 1.676 m (5' 6\")   Wt 65.8 kg (145 lb)   SpO2 95%   BMI 23.40 kg/m²  "  Body mass index is 23.4 kg/m².    Physical Exam  Constitutional:       Appearance: Normal appearance.   Cardiovascular:      Rate and Rhythm: Normal rate and regular rhythm.      Pulses: Normal pulses.      Heart sounds: Normal heart sounds.   Pulmonary:      Effort: Pulmonary effort is normal.      Breath sounds: Normal breath sounds.   Musculoskeletal:      Cervical back: Normal range of motion and neck supple.   Neurological:      Mental Status: She is alert.                Assessment & Plan:     24 y.o. female with the following -     1. Daytime sleepiness  - Chronic, no improvement with modafinil  - Does not have a definitive dose of narcolepsy but was given a trial of modafinil for 30 days.  - Without noticeable improvement in her symptoms this diagnosis is questionable.  She has about 14 pills left.  Patient advised to do another 2-week trial prior to her consultation with sleep medicine for reevaluation.    2. Hypnagogic hallucinations  - Intermittent problem.  Patient reports that she did not fall asleep during class or during her final exams while taking the modafinil.  She is not sure if this is a coincidence or if modafinil helped.  - Follow-up with sleep medicine for further evaluation    3. Obstructive sleep apnea syndrome  - Chronic problem, inconsistent use of CPAP machine.   - Advised that she use her dental appliance if she does not have her CPAP machine.  - Continue to use CPAP machine as much as possible and follow-up with new consultation with sleep medicine on July 8  - Limit use of alcohol, sleep in a upright position on the side    requesting record of last PAP [facility]    Return for as scheduled 9/10/2025.    Please note that this dictation was created using voice recognition software. I have made every reasonable attempt to correct obvious errors, but I expect that there are errors of grammar and possibly content that I did not discover before finalizing the note.

## 2025-07-08 ENCOUNTER — OFFICE VISIT (OUTPATIENT)
Dept: SLEEP MEDICINE | Facility: MEDICAL CENTER | Age: 25
End: 2025-07-08
Attending: BEHAVIOR ANALYST
Payer: COMMERCIAL

## 2025-07-08 VITALS
HEIGHT: 66 IN | BODY MASS INDEX: 22.5 KG/M2 | HEART RATE: 75 BPM | DIASTOLIC BLOOD PRESSURE: 62 MMHG | RESPIRATION RATE: 16 BRPM | WEIGHT: 140 LBS | OXYGEN SATURATION: 98 % | SYSTOLIC BLOOD PRESSURE: 102 MMHG

## 2025-07-08 DIAGNOSIS — G47.33 OSA (OBSTRUCTIVE SLEEP APNEA): ICD-10-CM

## 2025-07-08 DIAGNOSIS — G47.10 HYPERSOMNIA, PERSISTENT: ICD-10-CM

## 2025-07-08 DIAGNOSIS — G47.33 OBSTRUCTIVE SLEEP APNEA SYNDROME: Primary | ICD-10-CM

## 2025-07-08 DIAGNOSIS — G47.19 EXCESSIVE DAYTIME SLEEPINESS: ICD-10-CM

## 2025-07-08 PROCEDURE — 99204 OFFICE O/P NEW MOD 45 MIN: CPT | Performed by: STUDENT IN AN ORGANIZED HEALTH CARE EDUCATION/TRAINING PROGRAM

## 2025-07-08 PROCEDURE — 99214 OFFICE O/P EST MOD 30 MIN: CPT | Performed by: BEHAVIOR ANALYST

## 2025-07-08 PROCEDURE — 3074F SYST BP LT 130 MM HG: CPT | Performed by: STUDENT IN AN ORGANIZED HEALTH CARE EDUCATION/TRAINING PROGRAM

## 2025-07-08 PROCEDURE — 3078F DIAST BP <80 MM HG: CPT | Performed by: STUDENT IN AN ORGANIZED HEALTH CARE EDUCATION/TRAINING PROGRAM

## 2025-07-08 RX ORDER — MODAFINIL 100 MG/1
100-200 TABLET ORAL DAILY
COMMUNITY
Start: 2025-04-28

## 2025-07-08 ASSESSMENT — FIBROSIS 4 INDEX: FIB4 SCORE: 0.6

## 2025-07-08 NOTE — PROGRESS NOTES
[facility] Sleep Center Consult Note     Date: 7/8/2025 / Time: 7:47 AM      Thank you for requesting a sleep medicine consultation on Annabel Ramírez at the sleep center. Presents today with the chief complaints of  excessive daytime sleepiness. She is referred by MONICA Chowdhury  [address]  [address]  [place] [address] for evaluation and treatment of sleep apnea and excessive daytime sleepiness.     HISTORY OF PRESENT ILLNESS:     Annabel Ramírez is a 24 y.o. female with excessive daytime sleepiness and obstructive sleep apnea on CPAP.  Presents to Sleep Clinic for evaluation of sleep.    She was diagnosed with mild obstructive sleep apnea a couple years ago and was placed on an oral appliance [place].  Her initial study was completed through outside facility which showed mild obstructive sleep apnea was present.  She was planning to study abroad so she did not want to start on CPAP therapy to travel internationally.  She used her oral appliance for some time and did notice some benefit in terms of sleep quality but her sleepiness persisted.    She completed a home sleep study in November 2024 which showed mild obstructive sleep apnea with an overall AHI 8.3 events an hour, RDI 14.3 events an hour, time at or below 88% saturation of 0.1 minutes.  Following the study she was placed on auto CPAP machine which she has continued.  She started her auto CPAP machine about 3-4 months ago.  She is using her machine regularly but has not noticed any benefit to her daytime sleepiness.  She was recently trialed on modafinil which helped some with her daytime sleepiness but she only takes it as needed.    She generally has no trouble falling asleep initially with the taking 5 minutes.  When she is asleep she sleeps through the night and if she were to awaken has no difficulty getting back to sleep.  She does not feel rested with awakening.  She naps daily.  She will take a  "10-minute nap in the middle of the day which she does feel more rested afterwards.  Her ideal napping length would be longer than 2 hours if she were allowed to based on her schedule.  She does have multiple alarms in the morning with awakening.    Denies any cataplexy.  She does endorse difficulty with note taking and staying awake in class.  She will sometimes hear things that are not there when she is trying to fight the urge to go to sleep during lectures and meetings.  She has experienced drowsiness while driving.    DME provider: jose   Device: AirSense 11  Mask: Nasal pillow  Aerophagia: No   Snoring: No   Dry mouth: No   Leak: No   Skin irritation: No   Chin strap: No     As per supplemental questionnaire to be scanned or imported into chart:    [place] Sleepiness Score: 14    Sleep Schedule  Bedtime: Weekday 1am  Weekend 3am  Wake time: Weekday 9am Weekend 11am  Sleep-onset latency: 5 min   Awakenings from sleep: 0-1  Difficulty falling back asleep: No  Bedroom partner: sometimes   Naps: once a day, 10 min, feels better after     DAYTIME SYMPTOMS:   Excessive daytime sleepiness: Yes  Daytime fatigue: occasionally   Difficulty concentrating: sometimes   Memory problems: No   Irritability:No   Work/school performance issues: due to sleepiness   Sleepiness with driving: Yes  Caffeine/stimulant use: Yes  Alcohol use:Yes, How Many?  1-2 drinks a month     SLEEP RELATED SYMPTOMS  Snoring: No   Witnessed apnea or gasping/choking: No   Dry mouth or mouth breathing: No   Night Sweating: No   Teeth grinding/biting: No   Morning headaches: No   Refreshed Upon Awakening: No      SLEEP RELATED BEHAVIORS:  Parasomnias (walking, talking, eating, violence): No   Leg kicking: No   Restless legs - \"urge to move\": sometimes   Nightmares: vivid dreams Recurrent: occasionally   Dream enactment: No      NARCOLEPSY:  Cataplexy: No   Sleep paralysis: No   Sleep attacks: Yes  Hypnagogic/hypnopompic hallucinations: No    MEDICAL " "HISTORY  Past Medical History[1]     SURGICAL HISTORY  Past Surgical History[2]     FAMILY HISTORY  Family History   Problem Relation Age of Onset    Breast Cancer Mother 56    No Known Problems Father     No Known Problems Brother     Drug abuse Paternal Uncle     Alcohol abuse Paternal Uncle     Hypertension Maternal Grandmother     Dementia Maternal Grandfather         Alzheimer’s    Cancer Paternal Grandmother         Brain cancer, early 30s    Cancer Paternal Grandfather         Lung cancer, 60s-70s       SOCIAL HISTORY  Social History[3]     Occupation:  at [employer]    CURRENT MEDICATIONS  Current Medications[4]    REVIEW OF SYSTEMS  Constitutional: Denies fevers, Denies weight changes  Ears/Nose/Throat/Mouth: Denies nasal congestion or sore throat   Cardiovascular: Denies chest pain  Respiratory: Denies shortness of breath, Denies cough  Gastrointestinal/Hepatic: Denies nausea, vomiting  Sleep: see HPI    Physical Examination:  Vitals/ General Appearance:   Weight/BMI: Body mass index is 22.6 kg/m².  /62 (BP Location: Left arm, Patient Position: Sitting, BP Cuff Size: Adult)   Pulse 75   Resp 16   Ht 1.676 m (5' 6\")   Wt 63.5 kg (140 lb)   SpO2 98%   Vitals:    07/08/25 0754   BP: 102/62   BP Location: Left arm   Patient Position: Sitting   BP Cuff Size: Adult   Pulse: 75   Resp: 16   SpO2: 98%   Weight: 63.5 kg (140 lb)   Height: 1.676 m (5' 6\")       Pt. is alert and oriented to time, place and person. Cooperative and in no apparent distress.     Constitutional: Alert, no distress, well-groomed.  Skin: No rashes in visible areas.  Eye: Round. Conjunctiva clear, lids normal. No icterus.   ENT EXAM  Nasal alae/valves collapsible: No   Nasal septum deviation: No   Nasal turbinate hypertrophy: No   Hard palate narrow: No   Hard palate high: No   Soft palate/uvula (Mallampati score): 2  Tongue Scalloping: Yes  Retrognathia: No   Micrognathia: No   Cardiovascular:no murmus/gallops/rubs, " normal S1 and S2 heart sounds, regular rate and rhythm  Pulmonary:Clear to auscultation, No wheezes, No crackles.  Neurologic:Awake, alert and oriented x 3, Normal age appropriate gait, No involuntary motions.  Extremities: No clubbing, cyanosis, or edema       ASSESSMENT AND PLAN   Annabel Ramírez is a 24 y.o. female with excessive daytime sleepiness and obstructive sleep apnea on CPAP.  Presents to Sleep Clinic for evaluation of sleep.    1.  Hypersomnia  Annabel is experiencing excessive daytime sleepiness despite treating her sleep apnea.  She has an elevated [place] score at today's visit of 14.  An idiopathic hypersomnia severity score (IHSS) was elevated at 31 at today's visit.  Patient does have daytime sleepiness which can impact her safety while driving.  In addition it can impact her work performance and school performance.  She denies sleep paralysis or cataplexy.  There is the possibility for narcolepsy type II or idiopathic hypersomnia.  Discussed differences between narcolepsy and idiopathic hypersomnia.  Discussed differences in management.  Advised that there are specialty medications for specific diagnoses.    Given elevated [place] score and IHSS score at today's visit there is the potential for idiopathic hypersomnia or narcolepsy type II.  There has been limited benefit with use of CPAP and regular usage of CPAP.  In addition patient has responded very little to 200 mg of modafinil.    Plan  -She will be scheduled for PSG and MSLT testing  - Given 2-week sleep diary to be completed leading up to her testing  - Advised to discontinue modafinil 2 weeks prior to testing  - Encouraged to avoid driving if drowsy.  If she does become drowsy advised to pull over.  If she knows she has a long drive may consider napping before drive.      2.  Obstructive sleep apnea  The medical record was reviewed.    Compliance data reviewed showing 63% usage > 4hours in last 30 days. Average AHI 0.9 events/hour. 90-95%  pressure 6.3 CWP. Pt continues to use and benefit from machine.     Auto CPAP 4-10    Advised patient when she is traveling or does spend the night where her CPAP is not present she should use her oral appliance.  Per outside records patient did have a sleep study with using her oral appliance that showed control of sleep apnea.      PLAN:   -Order placed for mask and supplies   -Advised to reach out via MyChart with questions     Has been advised to continue the current CPAP, clean equipment frequently, and get new mask and supplies as allowed by insurance and DME. Recommend an earlier appointment, if significant treatment barriers develop.    Patients with MOIRA are at increased risk of cardiovascular disease including coronary artery disease, systemic arterial hypertension. The patient was advised to avoid driving a motor vehicle when drowsy.      Return for after sleep study.        Please note portions of this record was created using voice recognition software. I have made every reasonable attempt to correct obvious errors, but I expect that there are errors of grammar and possibly content I did not discover before finalizing the note.           [1]   Past Medical History:  Diagnosis Date    Daytime sleepiness     Eczema     Scoliosis    [2]   Past Surgical History:  Procedure Laterality Date    DENTAL EXTRACTION(S)      DENTAL EXTRACTION(S)      [place]   [3]   Social History  Socioeconomic History    Marital status: Single    Highest education level: Some college, no degree   Tobacco Use    Smoking status: Never    Smokeless tobacco: Never   Vaping Use    Vaping status: Never Used   Substance and Sexual Activity    Alcohol use: Yes     Comment: 1-2 a month    Drug use: Never    Sexual activity: Yes     Partners: Male     Birth control/protection: I.U.D.     Comment: Mirena     Social Drivers of Health     Financial Resource Strain: Low Risk  (9/8/2024)    Overall Financial Resource Strain ([facility])      Difficulty of Paying Living Expenses: Not hard at all   Food Insecurity: No Food Insecurity (9/8/2024)    Hunger Vital Sign     Worried About Running Out of Food in the Last Year: Never true     Ran Out of Food in the Last Year: Never true   Transportation Needs: No Transportation Needs (9/8/2024)    PRAPARE - Transportation     Lack of Transportation (Medical): No     Lack of Transportation (Non-Medical): No   Physical Activity: Insufficiently Active (9/8/2024)    Exercise Vital Sign     Days of Exercise per Week: 2 days     Minutes of Exercise per Session: 50 min   Stress: No Stress Concern Present (9/8/2024)    [place] [employer] - Occupational Stress Questionnaire     Feeling of Stress : Only a little   Social Connections: Moderately Isolated (9/8/2024)    Social Connection and Isolation Panel [NHANES]     Frequency of Communication with Friends and Family: More than three times a week     Frequency of Social Gatherings with Friends and Family: More than three times a week     Attends Sikh Services: Never     Active Member of Clubs or Organizations: Yes     Attends Club or Organization Meetings: More than 4 times per year     Marital Status: Never    Housing Stability: High Risk (9/8/2024)    Housing Stability Vital Sign     Unable to Pay for Housing in the Last Year: No     Number of Times Moved in the Last Year: 2     Homeless in the Last Year: No   [4]   Current Outpatient Medications   Medication Sig Dispense Refill    modafinil (PROVIGIL) 100 MG Tab Take 100-200 mg by mouth every day.      levonorgestrel (MIRENA, 52 MG,) 52 mg (20 mcg/24 hr) IUD 1 Each by Intrauterine route one time.      betamethasone dipropionate (DEL-BETA) 0.05 % Ointment Apply a thin layer to rash BID PRN. Do not use longer than 14 days straight. (Patient not taking: Reported on 4/10/2025) 50 g 1     No current facility-administered medications for this visit.

## 2025-08-26 ENCOUNTER — APPOINTMENT (OUTPATIENT)
Dept: SLEEP MEDICINE | Facility: MEDICAL CENTER | Age: 25
End: 2025-08-26
Payer: COMMERCIAL

## 2025-08-27 ENCOUNTER — APPOINTMENT (OUTPATIENT)
Dept: SLEEP MEDICINE | Facility: MEDICAL CENTER | Age: 25
End: 2025-08-27
Payer: COMMERCIAL